# Patient Record
Sex: FEMALE | Race: WHITE | Employment: OTHER | URBAN - METROPOLITAN AREA
[De-identification: names, ages, dates, MRNs, and addresses within clinical notes are randomized per-mention and may not be internally consistent; named-entity substitution may affect disease eponyms.]

---

## 2017-02-23 ENCOUNTER — NURSE ONLY (OUTPATIENT)
Age: 62
End: 2017-02-23

## 2017-02-23 DIAGNOSIS — M81.0 OSTEOPOROSIS, POSTMENOPAUSAL: Primary | ICD-10-CM

## 2017-02-23 PROCEDURE — 96372 THER/PROPH/DIAG INJ SC/IM: CPT | Performed by: INTERNAL MEDICINE

## 2017-03-29 ENCOUNTER — EMPLOYEE WELLNESS (OUTPATIENT)
Dept: OTHER | Age: 62
End: 2017-03-29

## 2017-04-24 ENCOUNTER — OFFICE VISIT (OUTPATIENT)
Dept: ENT CLINIC | Age: 62
End: 2017-04-24

## 2017-04-24 VITALS
SYSTOLIC BLOOD PRESSURE: 101 MMHG | WEIGHT: 122.8 LBS | BODY MASS INDEX: 23.18 KG/M2 | HEART RATE: 76 BPM | DIASTOLIC BLOOD PRESSURE: 62 MMHG | TEMPERATURE: 98 F | HEIGHT: 61 IN

## 2017-04-24 DIAGNOSIS — H91.11: ICD-10-CM

## 2017-04-24 DIAGNOSIS — H69.80 ETD (EUSTACHIAN TUBE DYSFUNCTION), UNSPECIFIED LATERALITY: Primary | ICD-10-CM

## 2017-04-24 PROCEDURE — 99214 OFFICE O/P EST MOD 30 MIN: CPT | Performed by: OTOLARYNGOLOGY

## 2017-08-29 ENCOUNTER — HOSPITAL ENCOUNTER (OUTPATIENT)
Dept: GENERAL RADIOLOGY | Age: 62
Discharge: HOME | End: 2017-08-29
Attending: INTERNAL MEDICINE | Admitting: INTERNAL MEDICINE

## 2017-08-29 ENCOUNTER — OFFICE VISIT (OUTPATIENT)
Age: 62
End: 2017-08-29

## 2017-08-29 VITALS
SYSTOLIC BLOOD PRESSURE: 114 MMHG | HEIGHT: 61 IN | BODY MASS INDEX: 22.88 KG/M2 | WEIGHT: 121.2 LBS | DIASTOLIC BLOOD PRESSURE: 68 MMHG

## 2017-08-29 DIAGNOSIS — R82.994 HYPERCALCIURIA: Chronic | ICD-10-CM

## 2017-08-29 DIAGNOSIS — M81.0 OSTEOPOROSIS, POSTMENOPAUSAL: Primary | Chronic | ICD-10-CM

## 2017-08-29 DIAGNOSIS — M81.0 OSTEOPOROSIS, POSTMENOPAUSAL: Chronic | ICD-10-CM

## 2017-08-29 DIAGNOSIS — E55.9 VITAMIN D DEFICIENCY: Chronic | ICD-10-CM

## 2017-08-29 DIAGNOSIS — Z51.81 MEDICATION MONITORING ENCOUNTER: Chronic | ICD-10-CM

## 2017-08-29 PROCEDURE — 96372 THER/PROPH/DIAG INJ SC/IM: CPT | Performed by: INTERNAL MEDICINE

## 2017-08-29 PROCEDURE — 77080 DXA BONE DENSITY AXIAL: CPT | Performed by: INTERNAL MEDICINE

## 2017-08-29 PROCEDURE — 99214 OFFICE O/P EST MOD 30 MIN: CPT | Performed by: INTERNAL MEDICINE

## 2017-08-29 RX ORDER — HYDROCHLOROTHIAZIDE 12.5 MG/1
12.5 CAPSULE, GELATIN COATED ORAL EVERY MORNING
Qty: 90 CAPSULE | Refills: 4 | Status: SHIPPED | OUTPATIENT
Start: 2017-08-29 | End: 2018-09-24 | Stop reason: SDUPTHER

## 2017-08-31 ENCOUNTER — PROCEDURE VISIT (OUTPATIENT)
Age: 62
End: 2017-08-31

## 2017-08-31 DIAGNOSIS — M81.0 OSTEOPOROSIS, POSTMENOPAUSAL: Primary | Chronic | ICD-10-CM

## 2017-11-29 ENCOUNTER — HOSPITAL ENCOUNTER (OUTPATIENT)
Dept: WOMENS IMAGING | Age: 62
Discharge: HOME | End: 2017-11-29
Attending: OBSTETRICS & GYNECOLOGY | Admitting: OBSTETRICS & GYNECOLOGY

## 2017-11-29 DIAGNOSIS — Z12.31 VISIT FOR SCREENING MAMMOGRAM: ICD-10-CM

## 2018-02-23 DIAGNOSIS — M81.0 OSTEOPOROSIS, POSTMENOPAUSAL: Primary | Chronic | ICD-10-CM

## 2018-03-01 ENCOUNTER — NURSE ONLY (OUTPATIENT)
Age: 63
End: 2018-03-01

## 2018-03-01 DIAGNOSIS — M81.0 OSTEOPOROSIS, POSTMENOPAUSAL: Primary | Chronic | ICD-10-CM

## 2018-03-01 PROCEDURE — 96372 THER/PROPH/DIAG INJ SC/IM: CPT | Performed by: INTERNAL MEDICINE

## 2018-03-20 VITALS — BODY MASS INDEX: 22.93 KG/M2 | WEIGHT: 121 LBS

## 2018-04-12 ENCOUNTER — EMPLOYEE WELLNESS (OUTPATIENT)
Dept: OTHER | Age: 63
End: 2018-04-12

## 2018-04-12 LAB
CHOLESTEROL, TOTAL: 229 MG/DL (ref 0–199)
GLUCOSE BLD-MCNC: 89 MG/DL (ref 70–99)
HDLC SERPL-MCNC: 85 MG/DL (ref 40–60)
LDL CHOLESTEROL CALCULATED: 125 MG/DL
TRIGL SERPL-MCNC: 95 MG/DL (ref 0–150)

## 2018-04-16 VITALS — WEIGHT: 119 LBS | BODY MASS INDEX: 22.53 KG/M2

## 2018-07-17 ENCOUNTER — TELEPHONE (OUTPATIENT)
Age: 63
End: 2018-07-17

## 2018-07-17 NOTE — TELEPHONE ENCOUNTER
Patient called related to prolia. Patient supplied the prolia but she was billed 2100.00 for the medication. [de-identified]. Per office noted the patent supplied the prolia. Office will contact patient once charges are reversed. E-mail sent to Juanpablo Dietrich, manager related to this bill.  She will contact the billing departmant

## 2018-09-13 ENCOUNTER — OFFICE VISIT (OUTPATIENT)
Dept: ORTHOPEDIC SURGERY | Age: 63
End: 2018-09-13

## 2018-09-13 VITALS
BODY MASS INDEX: 22.66 KG/M2 | WEIGHT: 120 LBS | DIASTOLIC BLOOD PRESSURE: 68 MMHG | HEART RATE: 75 BPM | SYSTOLIC BLOOD PRESSURE: 108 MMHG | HEIGHT: 61 IN

## 2018-09-13 DIAGNOSIS — M79.645 CHRONIC PAIN OF LEFT THUMB: Primary | ICD-10-CM

## 2018-09-13 DIAGNOSIS — M18.12 ARTHRITIS OF CARPOMETACARPAL (CMC) JOINT OF LEFT THUMB: ICD-10-CM

## 2018-09-13 DIAGNOSIS — G89.29 CHRONIC PAIN OF LEFT THUMB: Primary | ICD-10-CM

## 2018-09-13 PROCEDURE — 99213 OFFICE O/P EST LOW 20 MIN: CPT | Performed by: ORTHOPAEDIC SURGERY

## 2018-09-13 PROCEDURE — L3918 METACARP FX ORTHOSIS PRE OTS: HCPCS | Performed by: ORTHOPAEDIC SURGERY

## 2018-09-13 NOTE — PROGRESS NOTES
and thumb abduction  Nontender along 1st dorsal compartment and negative Finkelstein's test left wrist  Nontender A1 pulley and no clicking or locking with thumb range of motion          Contralateral thumb:  No tenderness with CMC gring or firm pressure over                                  trapeziometacarpal joint. Satisfactory stability exists at MP joint. Negative provocative testing for left carpal tunnel syndrome including negative Tinel's and direct carpal tunnel compression test      DIAGNOSTIC TESTING: 3 of Left thumb(s): reveal degenerative change at the [facility] joint without obvious acute fracture. IMPRESSION AND PLAN: 57-year-old female with two-year history of left basal thumb joint pain  1. Degenerative arthritis of Left thumb(s). We discussed this common entity and appropriate conservative and surgical options. Appropriate initial steps include activity modification, rest, splinting, hand therapy, and injection. I also recommend utilizing  modifiers that decrease thumb pinch stress. Surgical intervention can usually be reserved for longstanding recalcitrant cases. Appropriate followup plans are discussed with the patient depending on the level of progress with the conservative care. For now, mutual decision was made to provide patient with thumb CMC wrap to be worn for comfort intermittently. We also discussed referral to occupational therapy today for activity modifications and strategies to improve her symptoms with day-to-day activities. She will also consider a corticosteroid injection in the future as an option and we'll plan to call with any worsening or persistent symptoms or with any other questions or concerns.  For now we will plan for follow-up on an as-needed basis as the patient desires

## 2018-09-14 DIAGNOSIS — M81.0 OSTEOPOROSIS, POSTMENOPAUSAL: Primary | Chronic | ICD-10-CM

## 2018-09-24 ENCOUNTER — PROCEDURE VISIT (OUTPATIENT)
Dept: ENDOCRINOLOGY | Age: 63
End: 2018-09-24
Payer: COMMERCIAL

## 2018-09-24 ENCOUNTER — HOSPITAL ENCOUNTER (OUTPATIENT)
Dept: OCCUPATIONAL THERAPY | Age: 63
Setting detail: THERAPIES SERIES
Discharge: HOME | End: 2018-09-24
Payer: COMMERCIAL

## 2018-09-24 ENCOUNTER — HOSPITAL ENCOUNTER (OUTPATIENT)
Dept: GENERAL RADIOLOGY | Age: 63
Discharge: HOME | End: 2018-09-24
Payer: COMMERCIAL

## 2018-09-24 ENCOUNTER — OFFICE VISIT (OUTPATIENT)
Dept: ENDOCRINOLOGY | Age: 63
End: 2018-09-24
Payer: COMMERCIAL

## 2018-09-24 VITALS
DIASTOLIC BLOOD PRESSURE: 63 MMHG | HEIGHT: 61 IN | BODY MASS INDEX: 23 KG/M2 | WEIGHT: 121.8 LBS | SYSTOLIC BLOOD PRESSURE: 101 MMHG

## 2018-09-24 DIAGNOSIS — M81.0 OSTEOPOROSIS, POSTMENOPAUSAL: Chronic | ICD-10-CM

## 2018-09-24 DIAGNOSIS — M81.0 OSTEOPOROSIS, POSTMENOPAUSAL: Primary | Chronic | ICD-10-CM

## 2018-09-24 DIAGNOSIS — E55.9 VITAMIN D DEFICIENCY: Chronic | ICD-10-CM

## 2018-09-24 DIAGNOSIS — Z51.81 MEDICATION MONITORING ENCOUNTER: Chronic | ICD-10-CM

## 2018-09-24 DIAGNOSIS — R82.994 HYPERCALCIURIA: Chronic | ICD-10-CM

## 2018-09-24 PROCEDURE — 97110 THERAPEUTIC EXERCISES: CPT | Performed by: OCCUPATIONAL THERAPIST

## 2018-09-24 PROCEDURE — 99214 OFFICE O/P EST MOD 30 MIN: CPT | Performed by: INTERNAL MEDICINE

## 2018-09-24 PROCEDURE — G8985 CARRY GOAL STATUS: HCPCS | Performed by: OCCUPATIONAL THERAPIST

## 2018-09-24 PROCEDURE — G8984 CARRY CURRENT STATUS: HCPCS | Performed by: OCCUPATIONAL THERAPIST

## 2018-09-24 PROCEDURE — 97535 SELF CARE MNGMENT TRAINING: CPT | Performed by: OCCUPATIONAL THERAPIST

## 2018-09-24 PROCEDURE — 77080 DXA BONE DENSITY AXIAL: CPT | Performed by: INTERNAL MEDICINE

## 2018-09-24 PROCEDURE — 97165 OT EVAL LOW COMPLEX 30 MIN: CPT | Performed by: OCCUPATIONAL THERAPIST

## 2018-09-24 PROCEDURE — 77080 DXA BONE DENSITY AXIAL: CPT

## 2018-09-24 PROCEDURE — 96372 THER/PROPH/DIAG INJ SC/IM: CPT | Performed by: INTERNAL MEDICINE

## 2018-09-24 RX ORDER — HYDROCHLOROTHIAZIDE 12.5 MG/1
12.5 CAPSULE, GELATIN COATED ORAL EVERY MORNING
Qty: 90 CAPSULE | Refills: 4 | Status: SHIPPED | OUTPATIENT
Start: 2018-09-24 | End: 2020-02-06

## 2018-09-24 NOTE — PLAN OF CARE
[facility]  [address], [address] [address], [address]  Phone: [phone] Fax: [phone]      [facility]  Dear Referring Practitioner: Sylvia Colvin MD,     We had the pleasure of evaluating the following patient for occupational therapy services at [address] [place] [address]. A summary of our findings can be found in the initial assessment below. This includes our plan of care. If you have any questions or concerns regarding these findings, please do not hesitate to contact me at the office phone number checked above. Thank you for the referral.     Physician Signature:_______________________________Date:__________________  By signing above (or electronic signature), therapists plan is approved by physician      Patient: Tyrel Jeff   : 1955   MRN: [id number]  Referring Physician: Referring Practitioner: Sylvia Colvin MD      Evaluation Date: 2018      Medical Diagnosis Information:  Diagnosis: M18.12 (ICD-10-CM) - Arthritis of carpometacarpal (Aia 16) joint of left thumb    Treatment Diagnosis: L thumb pain - M79.645                  Insurance information: OT Insurance Information: Medical [place]  Date of Injury: NA  Date of Surgery: NA      Precautions/ Contra-indications: -  Latex Allergy:  [x]No      []Yes  Pacemaker:  [x] No       [] Yes     Preferred Language for Healthcare:   [x]English       []other:      G-Codes:  OT G-codes  Functional Assessment Tool Used: Quick DASH  Score: 34%  Functional Limitation: Carrying, moving and handling objects  Carrying, Moving and Handling Objects Current Status (): At least 20 percent but less than 40 percent impaired, limited or restricted  Carrying, Moving and Handling Objects Goal Status ():  At least 20 percent but less than 40 percent impaired, limited or restricted    [x] Patient reported history, allergies, and medications reviewed - see intake form. SUBJECTIVE:  Background/Relevant Medical & Therapy History: progressive pain in L thumb x several years      Pain Scale: 0/10 at rest, 4/10 with activities, greater at times   []Constant      [x]Intermittent    []other:  Pain Location:  L thumb CMC  Easing factors: rest, heat  Provocative factors: activities, pressure      Occupational Profile:  Home Enviroment: lives with  [x] spouse,  [] family,  [] alone,  [] significant other,   [] other:    Occupation/School: nurse    Recreational Activities/Meaningful Interests: caring for granddaughter, working out    Prior Level of Function: [x] Independent with ADLs/IADLs     [] Assistance needed (describe):    Patient-Identified Primary Performance Deficits (to be addressed in POC):   [] bathing    [x] household tasks    [] dressing    [] self feeding   [] grooming    [x] work/education   [] functional mobility   [] sleeping/rest   [] toileting/hygiene   [x] recreational activities   [] driving    [] community/social participation   [] other:     Comorbidities Affecting Functional Performance:     []Anxiety (F41.9)/Depression (F32.9)   []Diabetes Type 1(E10.65) or 2 (E11.65)   []Rheumatoid Arthritis (M05.9)  []Fibromyalgia (M79.7)  []Neuropathy(G60.9)  [x]Osteoarthritis(M19.91)  []None   [x]Other: osteoporosis    Hand Dominance:   [x]  Right    [] Left      OBJECTIVE:     Involved   AROM: Right Left   Thumb MP  IP 0/67  >0/64 25HE//67  20HE/55   Thumb tip to DPFC  Below DPFC   Thumb RA               PA 50  45 50  45   Wrist Ext/Flex            RD/UD     Edema:     Thumb P1 5.8 5.8        Strength:      II 65# 60#   Lateral Pinch 14 14   3 Point Pinch 12 10     Observations (including splints, bandages, incisions, scars):    Hypermobile L thumb MP noted (severe MP HE observed with attempts at lateral pinch on L)     Sensation: [] No reported deficits  [x] Intact to light touch    [] Kensett Rodrigo test completed, findings as noted:  [] Other:    Palpation: mild tenderness about L thumb CMC/thenar region    Functional Mobility/Transfers/Gait: [x] Independent - no significant gait deviations  [] Assistance needed   [] Assistive device used: Falls Risk Assessment (30 days):   [x] Falls Risk assessed and no intervention required. [] Falls Risk assessed and Patient requires intervention due to being higher risk   TUG score (>12s at risk):     [] Falls education provided, including      Review Of Systems (ROS): [x]Performed Review of systems (Integumentary, CardioPulmonary, Neurological) by intake and observation. Intake form has been scanned into medical record. Patient has been instructed to contact their primary care physician regarding ROS issues if not already being addressed at this time. ASSESSMENT:   This patient presents with signs and symptoms consistent with the medical diagnosis provided by the referring physician. Impairments (physical, cognitive and/or psychosocial):  [x] Decreased/Excessive mobility [] Weakness    [] Hypersensitivity   [x] Pain/tenderness   [] Edema/swelling   [] Decreased coordination (fine/gross motor)   [] Impaired body mechanics  [] Sensory loss  [] Loss of balance   [] Other:      Performance Deficits (to be addressed in plan of care):   [] Bathing    [x] Household Tasks   [] Dressing    [] Self Feeding   [] Grooming    [x] Work/Education   [] Functional Mobility   [] Sleeping/Rest   [] Toileting/Hygiene   [x] Recreational Activities   [] Driving    [] Community/Social Participation   [] Other:     Rehab Potential:   [] Excellent [] Good [x] Fair  [] Poor     Barriers affecting rehab potential:  [x]Age    []Lack of Motivation   []Co-Morbidities  []Cognitive Function  []Environmental/home/work barriers  []Other:     Tolerance of evaluation/treatment:    [] Excellent [x] Good [] Fair  [] Poor      PLAN OF CARE:  Interventions:   [x] Therapeutic Exercise [x] Therapeutic Activity    [x] Activities of Daily Living [x] Neuromuscular Re-education      [x] Patient Education  [x] Manual Therapy      [x] Modalities as needed, and not otherwise contraindicated, including: ultrasound,paraffin,moist heat/cold pack, electrical stimulation, contrast bath, iontophoresis, fluidotherapy  [x] Splinting    Frequency/Duration:  1-2 visits over 4 weeks      GOALS:  Short Term Goals: To be achieved in: 2 weeks  1. Independent in HEP and progression per patient tolerance, in order to prevent re-injury. 2. Patient will have a decrease in pain to facilitate improvement in movement, function, and ADLs as indicated by Functional Deficits. Long Term Goals to be achieved in 4  weeks, including patient directed goals to address identified performance deficits:  1) Pt to be independent in graded HEP progression with a good level of effort and compliance. 2) Pt to report a score of 25 % or less on the Quick DASH disability questionnaire for increased performance with carrying, moving, and handling objects. 3) Pt will verbalize understanding and demonstrate competency with 3 joint protection and ADL modification techniques to enable independence with resistive household tasks (opening jar lids, cutting, carrying). 4) Pt will have a decrease in pain to 1-2/10 to facilitate improvement in performance of work tasks.         OCCUPATIONAL THERAPY EVALUATION COMPLEXITY JUSTIFICATION:    [x] An occupational profile and medical/therapy history, which includes:   [x] a brief history including medical and/or therapy records relating to the     presenting problem   [] an expanded review of medical and/or therapy records and additional review     of physical, cognitive or psychosocial history related to current functional    performance   [] an extensive additional review of review of medical and/or therapy records   and physical, cognitive, or psychosocial history related to current    functional performance    [x] An assessment that identifies performance deficits (relating to physical, cognitive, or psychosocial skills) that result in activity limitations and/or participation restrictions:   [x] 1-3 performance deficits   [] 3-5 performance deficits   [] 5 or more performance deficits    [x] Clinical decision making of:   [x] low complexity, including analysis of occupational profile, data analysis from problem focused assessment, and consideration of a limited number of treatment options. No comorbidities affect occupational performance. No task modifications or assistance needed to complete evaluation. [] moderate complexity, including analysis of occupational profile, data analysis from detailed assessment and consideration of several treatment options. Comorbidities that affect occupational performance may be present. Minimal to moderate task modifications or assistance needed to complete assessment. [] high complexity, including analysis of occupational profile, analysis of data from comprehensive assessment and consideration of multiple treatment options. Multiple comorbidities present that affect occupational performance. Significant task modifications or assistance needed to complete assessment. Evaluation Code:  [x] Low Complexity EVAL 06811 (typically 30 minutes face to face)  [] Mod Complexity EVAL 68322 (typically 45 minutes face to face)  [] High Complexity EVAL 17596 (typically 60 minutes face to face)             Electronically signed by:   Sola Colon OTR/L, PT, MPT, [address] [place] [address], [phone], [phone]

## 2018-09-24 NOTE — FLOWSHEET NOTE
[facility]  [address], [address] [facility]  Phone: [phone] Fax: [phone]      Hand Therapy Daily Treatment Note  Date:  2018    Patient: Fabrizio Michel   : 1955   MRN: [id number]  Referring Physician: Referring Practitioner: Ramu Ortiz MD       Medical Diagnosis Information:  Diagnosis: M18.12 (ICD-10-CM) - Arthritis of carpometacarpal (Aia 16) joint of left thumb    Treatment Diagnosis: L thumb pain - M79.645                                         Insurance information: OT Insurance Information: Medical [place]  Date of Injury:NA  Date of Surgery:NA      Visit # Insurance Allowable   1 BMN     Date of Patient follow up with Physician: prn    G-Codes:  OT G-codes  Functional Assessment Tool Used: Quick DASH  Score: 34%  Functional Limitation: Carrying, moving and handling objects  Carrying, Moving and Handling Objects Current Status (): At least 20 percent but less than 40 percent impaired, limited or restricted  Carrying, Moving and Handling Objects Goal Status ():  At least 20 percent but less than 40 percent impaired, limited or restricted    Progress Note: []  Yes  [x]  No  Next due by: Visit #10      Latex Allergy:  [x]NO      []YES            Pacemaker:  [x] No       [] Yes      Preferred Language for Healthcare:   [x]English       []other:    Pain level:  See eval    SUBJECTIVE:  See eval    RESTRICTIONS/PRECAUTIONS: -    OBJECTIVE:       Date:  2018       Objective Measures:         See eval                       Modalities:         Discussed heat/cold modalities for pain management                       Therapeutic Exercise, Activities, NMR:        AROM Instructed on general thumb/hand AROM, pinch/ mechanics, technique considerations for weight training/working out       ADL Retraining Instructed on diagnosis specific anatomy, joint protection, and ADL modifications - joint protection and extensibility and allowing for proper ROM for normal function with self care, reaching, carrying, lifting, house/yardwork, driving/computer work  [] Comments:    ADL Training:  [x]  (78974) Provided self-care/home management training related to activities of daily living and compensatory training, and/or use of adaptive equipment   [x] Comments: Instructed on diagnosis specific anatomy, joint protection, and ADL modifications - joint protection and resource information provided     Splinting:  [] Fabrication of:   [] (97168) Orthotic/Prosthetic Management, subsequent encounter  [] (04046) Orthotic management and training (fitting and assessment)  [x] Comments: discussed indications for and use of Neoprene and custom thermoplastic splints - pt has neoprene brace at home, desires to wait on fabrication of thermoplastic brace at this time     Charges:  Timed Code Treatment Minutes: 40   Total Treatment Minutes: 60     [x] EVAL (LOW) 30312   [] OT Re-eval (53949)  [] EVAL (MOD) 59219   [] EVAL (HIGH) 55380       [x] Haley (57651) x  1   [] UXFKP(94369)  [] NMR (43225) x      [] Estim (attended) (98802)   [] Manual ([id number]) x       [] US (08209)  [] TA (08294) x      [] Paraffin (30890)  [x] ADL  (31575) x  2  [] Splint/L code:    [] Estim (unattended) (16247)  [] Fluidotherapy (86916)  [] Other:    GOALS: ***    Progression Towards Functional goals:  [] Patient is progressing as expected towards functional goals listed. [] Progression is slowed due to complexities listed. [] Progression has been slowed due to co-morbidities.   [x] Plan just implemented, too soon to assess goals progression  [] All goals are met  [] Other:     ASSESSMENT:  See eval    Treatment/Activity Tolerance:  [x] Patient tolerated treatment well [] Patient limited by fatique  [] Patient limited by pain  [] Patient limited by other medical complications  [] Other:     Prognosis: [x] Good [] Fair  [] Poor    Patient Requires Follow-up: [] Yes  [x]

## 2018-09-24 NOTE — PROGRESS NOTES
to 11/2010 at the spine. Although stable since, BMD is still quite low. She is doing well with Prolia started 07/2014. Vitamin D deficiency has been corrected. Hypercalciuria is controlled with HCTZ 12.5 mg/d. PLANS: Continue HCTZ 12.5 mg/d. OK to give Prolia today and continue Prolia 60 mg SQ twice yearly. Return appointment with DXA in 1 year. I spent 25 minutes face to face with this patient. Over 50% of that time was spent on counseling and care coordination. See assessment and plan for counseling and care coordination details. Kwame Clark MD, Director, [facility]) Osteoporosis and Bone Health Services    CC: Ani Garcia MD

## 2018-10-16 ENCOUNTER — TELEPHONE (OUTPATIENT)
Dept: ORTHOPEDIC SURGERY | Age: 63
End: 2018-10-16

## 2018-11-27 ENCOUNTER — OFFICE VISIT (OUTPATIENT)
Dept: PSYCHOLOGY | Age: 63
End: 2018-11-27
Payer: COMMERCIAL

## 2018-11-27 DIAGNOSIS — F32.A DEPRESSION, UNSPECIFIED DEPRESSION TYPE: Primary | ICD-10-CM

## 2018-11-27 PROCEDURE — 90791 PSYCH DIAGNOSTIC EVALUATION: CPT | Performed by: PSYCHOLOGIST

## 2018-11-27 ASSESSMENT — PATIENT HEALTH QUESTIONNAIRE - PHQ9
SUM OF ALL RESPONSES TO PHQ QUESTIONS 1-9: 0
SUM OF ALL RESPONSES TO PHQ9 QUESTIONS 1 & 2: 0
2. FEELING DOWN, DEPRESSED OR HOPELESS: 0
1. LITTLE INTEREST OR PLEASURE IN DOING THINGS: 0
SUM OF ALL RESPONSES TO PHQ QUESTIONS 1-9: 0

## 2018-11-27 NOTE — PROGRESS NOTES
Behavioral Health Consultation  Moni Neal PsyD  Psychologist  11/27/2018  9:10 AM      Time spent with Patient: 35 minutes  This is patient's first  [facility] appointment. Reason for Consult:  Depression  Referring Provider: Destiny Valdes MD  [address] [place], [address]    Pt provided informed consent for the behavioral health program. Discussed with patient model of service to include the limits of confidentiality (i.e. abuse reporting, suicide intervention, etc.) and short-term intervention focused approach. Pt indicated understanding. Feedback given to PCP. 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 Pt assertively approached direct supervisor about his verbally aggressive behavior, he is now on a individualized behavior plan. Pt feels this is right and he is making changes but not sure if she still wants to be there.       Past psych tx: none    Current psych med tx: none    Psych ROS:      Depression: negative screen     Marii: DENIES insomnia with increased energy, rapid speech, easily distracted or decreased attention, irritability, racing thoughts, expansive mood, increase in energy and goal directed behavior, grandiosity, flight of ideas     Anxiety:  see JESSICA-7 score    OCD:  Denies     Panic:  Denies     Psychosis: denies A/VH, or delusions     PTSD:  negative screen    O:  MSE:    Appearance    alert, cooperative  Appetite normal  Sleep disturbance No  Fatigue Yes  Loss of pleasure Yes  Impulsive behavior No  Speech    normal rate, normal volume and well articulated  Mood    stressed  Affect    Congruent with full range  Thought Content    intact  Thought Process    linear, goal directed and coherent  Associations    logical connections  Insight    Good  Judgment    Intact  Orientation    oriented to person, place, time, and general circumstances  Memory    recent and remote

## 2018-12-04 ASSESSMENT — ANXIETY QUESTIONNAIRES
2. NOT BEING ABLE TO STOP OR CONTROL WORRYING: 1-SEVERAL DAYS
3. WORRYING TOO MUCH ABOUT DIFFERENT THINGS: 1-SEVERAL DAYS
4. TROUBLE RELAXING: 0-NOT AT ALL SURE
6. BECOMING EASILY ANNOYED OR IRRITABLE: 0-NOT AT ALL SURE
1. FEELING NERVOUS, ANXIOUS, OR ON EDGE: 1-SEVERAL DAYS
GAD7 TOTAL SCORE: 4
7. FEELING AFRAID AS IF SOMETHING AWFUL MIGHT HAPPEN: 1-SEVERAL DAYS
5. BEING SO RESTLESS THAT IT IS HARD TO SIT STILL: 0-NOT AT ALL SURE

## 2019-01-17 ENCOUNTER — HOSPITAL ENCOUNTER (OUTPATIENT)
Dept: WOMENS IMAGING | Age: 64
Discharge: HOME | End: 2019-01-17
Payer: COMMERCIAL

## 2019-01-17 DIAGNOSIS — Z12.31 VISIT FOR SCREENING MAMMOGRAM: ICD-10-CM

## 2019-01-17 PROCEDURE — 77063 BREAST TOMOSYNTHESIS BI: CPT

## 2019-01-29 ENCOUNTER — OFFICE VISIT (OUTPATIENT)
Dept: DERMATOLOGY | Age: 64
End: 2019-01-29
Payer: COMMERCIAL

## 2019-01-29 DIAGNOSIS — D22.9 MULTIPLE NEVI: Primary | ICD-10-CM

## 2019-01-29 DIAGNOSIS — L57.0 AK (ACTINIC KERATOSIS): ICD-10-CM

## 2019-01-29 DIAGNOSIS — L81.4 LENTIGINES: ICD-10-CM

## 2019-01-29 PROCEDURE — 17000 DESTRUCT PREMALG LESION: CPT | Performed by: DERMATOLOGY

## 2019-01-29 PROCEDURE — 99213 OFFICE O/P EST LOW 20 MIN: CPT | Performed by: DERMATOLOGY

## 2019-01-29 PROCEDURE — 17003 DESTRUCT PREMALG LES 2-14: CPT | Performed by: DERMATOLOGY

## 2019-03-28 ENCOUNTER — NURSE ONLY (OUTPATIENT)
Dept: ENDOCRINOLOGY | Age: 64
End: 2019-03-28
Payer: COMMERCIAL

## 2019-03-28 PROCEDURE — 96372 THER/PROPH/DIAG INJ SC/IM: CPT | Performed by: INTERNAL MEDICINE

## 2019-05-21 ENCOUNTER — OFFICE VISIT (OUTPATIENT)
Dept: FAMILY MEDICINE CLINIC | Age: 64
End: 2019-05-21
Payer: COMMERCIAL

## 2019-05-21 VITALS
DIASTOLIC BLOOD PRESSURE: 64 MMHG | OXYGEN SATURATION: 98 % | SYSTOLIC BLOOD PRESSURE: 112 MMHG | WEIGHT: 118 LBS | BODY MASS INDEX: 22.28 KG/M2 | HEART RATE: 71 BPM | HEIGHT: 61 IN

## 2019-05-21 DIAGNOSIS — R82.994 HYPERCALCIURIA: Chronic | ICD-10-CM

## 2019-05-21 DIAGNOSIS — E55.9 VITAMIN D DEFICIENCY: Chronic | ICD-10-CM

## 2019-05-21 DIAGNOSIS — R00.2 HEART PALPITATIONS: ICD-10-CM

## 2019-05-21 DIAGNOSIS — M81.0 OSTEOPOROSIS, POSTMENOPAUSAL: Primary | Chronic | ICD-10-CM

## 2019-05-21 LAB
A/G RATIO: 2.1 (ref 1.1–2.2)
ALBUMIN SERPL-MCNC: 4.5 G/DL (ref 3.4–5)
ALP BLD-CCNC: 45 U/L (ref 40–129)
ALT SERPL-CCNC: 12 U/L (ref 10–40)
ANION GAP SERPL CALCULATED.3IONS-SCNC: 10 MMOL/L (ref 3–16)
AST SERPL-CCNC: 17 U/L (ref 15–37)
BASOPHILS ABSOLUTE: 0 K/UL (ref 0–0.2)
BASOPHILS RELATIVE PERCENT: 0.5 %
BILIRUB SERPL-MCNC: 0.6 MG/DL (ref 0–1)
BUN BLDV-MCNC: 15 MG/DL (ref 7–20)
CALCIUM SERPL-MCNC: 9.6 MG/DL (ref 8.3–10.6)
CHLORIDE BLD-SCNC: 107 MMOL/L (ref 99–110)
CO2: 27 MMOL/L (ref 21–32)
CREAT SERPL-MCNC: 0.7 MG/DL (ref 0.6–1.2)
EOSINOPHILS ABSOLUTE: 0.1 K/UL (ref 0–0.6)
EOSINOPHILS RELATIVE PERCENT: 1.8 %
GFR AFRICAN AMERICAN: >60
GFR NON-AFRICAN AMERICAN: >60
GLOBULIN: 2.1 G/DL
GLUCOSE BLD-MCNC: 93 MG/DL (ref 70–99)
HCT VFR BLD CALC: 42.9 % (ref 36–48)
HEMOGLOBIN: 14.9 G/DL (ref 12–16)
LYMPHOCYTES ABSOLUTE: 1.3 K/UL (ref 1–5.1)
LYMPHOCYTES RELATIVE PERCENT: 33.3 %
MCH RBC QN AUTO: 31.7 PG (ref 26–34)
MCHC RBC AUTO-ENTMCNC: 34.7 G/DL (ref 31–36)
MCV RBC AUTO: 91.5 FL (ref 80–100)
MONOCYTES ABSOLUTE: 0.3 K/UL (ref 0–1.3)
MONOCYTES RELATIVE PERCENT: 8.6 %
NEUTROPHILS ABSOLUTE: 2.1 K/UL (ref 1.7–7.7)
NEUTROPHILS RELATIVE PERCENT: 55.8 %
PDW BLD-RTO: 13.5 % (ref 12.4–15.4)
PLATELET # BLD: 159 K/UL (ref 135–450)
PMV BLD AUTO: 9.2 FL (ref 5–10.5)
POTASSIUM SERPL-SCNC: 5 MMOL/L (ref 3.5–5.1)
RBC # BLD: 4.69 M/UL (ref 4–5.2)
SODIUM BLD-SCNC: 144 MMOL/L (ref 136–145)
TOTAL PROTEIN: 6.6 G/DL (ref 6.4–8.2)
TSH REFLEX: 1.74 UIU/ML (ref 0.27–4.2)
WBC # BLD: 3.8 K/UL (ref 4–11)

## 2019-05-21 PROCEDURE — 36415 COLL VENOUS BLD VENIPUNCTURE: CPT | Performed by: INTERNAL MEDICINE

## 2019-05-21 PROCEDURE — 93000 ELECTROCARDIOGRAM COMPLETE: CPT | Performed by: INTERNAL MEDICINE

## 2019-05-21 PROCEDURE — 99204 OFFICE O/P NEW MOD 45 MIN: CPT | Performed by: INTERNAL MEDICINE

## 2019-05-21 ASSESSMENT — PATIENT HEALTH QUESTIONNAIRE - PHQ9
SUM OF ALL RESPONSES TO PHQ QUESTIONS 1-9: 0
SUM OF ALL RESPONSES TO PHQ9 QUESTIONS 1 & 2: 0
1. LITTLE INTEREST OR PLEASURE IN DOING THINGS: 0
SUM OF ALL RESPONSES TO PHQ QUESTIONS 1-9: 0
2. FEELING DOWN, DEPRESSED OR HOPELESS: 0

## 2019-05-21 NOTE — PROGRESS NOTES
Kvng Salas   1955      Reason for visit:   Chief Complaint   Patient presents with    [facility]        HPI:  Patient presents establish care. She is a former patient of Dr. Chadwick Rogel. She has a history of postmenopausal osteoporosis currently managed with prolia. She has seen [facility] for this and is to follow with them annually. She is tolerating very well. She also has a history of hypercalciuria and has been worked up and currently managed with hydrochlorothiazide. She has a h/o vit d deficiency that was also corrected by endocrinology. She does report a recent issue with tachycardia. She reports this occurred one month ago and lasted for approximately 2 weeks. She believes it was related to stress at that time as it did resolve once the stress resolved. She has not had any recent chest pain, shortness of breath, palpitations. She reports that they did a rhythm strip for her in the endoscopy suite and there were no concerns. She otherwise feels well. 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 , Disp: , Rfl:     Multiple Vitamins-Minerals (THERAPEUTIC MULTIVITAMIN-MINERALS) tablet, Take 1 tablet by mouth daily.  Vit D 1000 units, Disp: , Rfl:     denosumab (PROLIA) 60 MG/ML SOLN SC injection, Inject 1 mL into the skin once for 1 dose Last dose 2018, Disp: 1 mL, Rfl: 1     No Known Allergies    Past Surgical History:   Procedure Laterality Date     SECTION  1985 [facility]    club foot surgery likely related to stress. We will do basic lab workup. She is counseled to contact me should she have any recurrent symptoms. She voiced agreement and understanding of plan. -     EKG 12 Lead  -     CBC Auto Differential  -     Comprehensive Metabolic Panel  -     TSH with Reflex    RTC 1 year     Kemal Jones M.D.   Internal Medicine and Pediatrics  [facility]

## 2019-05-24 ENCOUNTER — TELEPHONE (OUTPATIENT)
Dept: FAMILY MEDICINE CLINIC | Age: 64
End: 2019-05-24

## 2019-05-24 NOTE — TELEPHONE ENCOUNTER
The PT called in for labs I gave all available results. \"pls notify pt her white blood cell count is just below normal. However, I see it has been an intermittent issue for her in the past for many years. Is she aware of that? We can continue to monitor it annually or can also offer a repeat white blood cell count in 1 month to follow it if she has any concerning symptoms (weight loss, fever/chills, nights sweats)\"        PT was aware of white blood cell count and said if she has any changes she'll call the office.

## 2019-05-31 ENCOUNTER — APPOINTMENT (OUTPATIENT)
Dept: GENERAL RADIOLOGY | Age: 64
End: 2019-05-31
Payer: COMMERCIAL

## 2019-05-31 ENCOUNTER — HOSPITAL ENCOUNTER (EMERGENCY)
Age: 64
Discharge: HOME | End: 2019-05-31
Payer: COMMERCIAL

## 2019-05-31 ENCOUNTER — NURSE TRIAGE (OUTPATIENT)
Dept: OTHER | Facility: CLINIC | Age: 64
End: 2019-05-31

## 2019-05-31 VITALS
DIASTOLIC BLOOD PRESSURE: 67 MMHG | HEART RATE: 83 BPM | OXYGEN SATURATION: 97 % | SYSTOLIC BLOOD PRESSURE: 111 MMHG | TEMPERATURE: 98 F | RESPIRATION RATE: 16 BRPM

## 2019-05-31 DIAGNOSIS — S93.401A SPRAIN OF RIGHT ANKLE, UNSPECIFIED LIGAMENT, INITIAL ENCOUNTER: Primary | ICD-10-CM

## 2019-05-31 PROCEDURE — 73610 X-RAY EXAM OF ANKLE: CPT

## 2019-05-31 PROCEDURE — 99283 EMERGENCY DEPT VISIT LOW MDM: CPT

## 2019-05-31 ASSESSMENT — ENCOUNTER SYMPTOMS
NAUSEA: 0
VOMITING: 0

## 2019-05-31 ASSESSMENT — PAIN DESCRIPTION - PAIN TYPE: TYPE: ACUTE PAIN

## 2019-05-31 ASSESSMENT — PAIN DESCRIPTION - ORIENTATION: ORIENTATION: RIGHT

## 2019-05-31 ASSESSMENT — PAIN DESCRIPTION - LOCATION: LOCATION: ANKLE

## 2019-05-31 ASSESSMENT — PAIN SCALES - GENERAL: PAINLEVEL_OUTOF10: 3

## 2019-05-31 NOTE — TELEPHONE ENCOUNTER
Reason for Disposition   A 'snap' or 'pop' was heard at the time of injury    Protocols used: ANKLE AND FOOT INJURY-ADULT-[place]    Patient's location of employment: [employer]  Location of injury: [facility],   Time of injury: early afternoon  Last 4 of patient's SSN: [id number]  Location recommended for treatment: [address] [facility], but she will not be able to get there during their open hours, so will proceed to [facility] ER. 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 She is able to walk, pain 4/10, no bruising or swelling at this time. She heard/felt a pop at time of injury. She has filled out Safe [employer] and notified her supervisor.

## 2019-05-31 NOTE — ED PROVIDER NOTES
R-1Normal      hydrochlorothiazide (MICROZIDE) 12.5 MG capsule Take 1 capsule by mouth every morning, Disp-90 capsule, R-4Normal      Estradiol (VAGIFEM VA) Place  vaginally. Multiple Vitamins-Minerals (THERAPEUTIC MULTIVITAMIN-MINERALS) tablet Take 1 tablet by mouth daily. Vit D 1000 units             ALLERGIES     Patient has no known allergies. FAMILY HISTORY           Problem Relation Age of Onset    High Blood Pressure Other      Family Status   Relation Name Status    Mother   at age 80        pulmonary fibrosis    Father   at age 76        pulmonary emboli - post op    Other  (Not Specified)        SOCIAL HISTORY      reports that she has never smoked. She has never used smokeless tobacco. She reports that she drinks alcohol. She reports that she does not use drugs. PHYSICAL EXAM    (up to 7 for level 4, 8 or more for level 5)     ED Triage Vitals [19 1547]   BP Temp Temp Source Pulse Resp SpO2 Height Weight   111/67 98 °F (36.7 °C) Oral 83 16 97 % -- --       Physical Exam   Constitutional: She is oriented to person, place, and time. She appears well-developed and well-nourished. No distress. HENT:   Head: Normocephalic and atraumatic. Nose: Nose normal.   Eyes: EOM are normal.   Neck: Normal range of motion. Neck supple. Pulmonary/Chest: Effort normal. No respiratory distress. Musculoskeletal:   Mild TTP lateral aspect of the right ankle behind the fibula. There is no erythema edema or ecchymosis. Has decreased ROM due to pain. Foot and tib/fib nontender. PT pulse 2+. Compartments of the leg are soft. Neurological: She is alert and oriented to person, place, and time. Skin: Skin is warm and dry. She is not diaphoretic. Psychiatric: She has a normal mood and affect.  Her behavior is normal. Judgment and thought content normal.       DIFFERENTIAL DIAGNOSIS   Fracture, dislocation, soft tissue injury      DIAGNOSTICRESULTS         RADIOLOGY:   Non-plain [address]  [phone]    Schedule an appointment as soon as possible for a visit in 3 days  for reevaluation    [facility] Emergency Department  [address]  [phone]    As needed, If symptoms worsen      DISCHARGE MEDICATIONS:  [unfilled]    (Please note that portions ofthis note were completed with a voice recognition program.  Efforts were made to edit the dictations but occasionally words are mis-transcribed.)    Jennifer Morocho, [address] [place] [address]  05/31/19 [id number]

## 2019-07-16 ENCOUNTER — NURSE ONLY (OUTPATIENT)
Dept: FAMILY MEDICINE CLINIC | Age: 64
End: 2019-07-16
Payer: COMMERCIAL

## 2019-07-16 DIAGNOSIS — D72.819 LEUKOPENIA, UNSPECIFIED TYPE: Primary | ICD-10-CM

## 2019-07-16 LAB
BASOPHILS ABSOLUTE: 0 K/UL (ref 0–0.2)
BASOPHILS RELATIVE PERCENT: 0.5 %
EOSINOPHILS ABSOLUTE: 0.1 K/UL (ref 0–0.6)
EOSINOPHILS RELATIVE PERCENT: 1.7 %
HCT VFR BLD CALC: 40.4 % (ref 36–48)
HEMOGLOBIN: 13.8 G/DL (ref 12–16)
LYMPHOCYTES ABSOLUTE: 1.6 K/UL (ref 1–5.1)
LYMPHOCYTES RELATIVE PERCENT: 31.6 %
MCH RBC QN AUTO: 31.3 PG (ref 26–34)
MCHC RBC AUTO-ENTMCNC: 34.1 G/DL (ref 31–36)
MCV RBC AUTO: 91.9 FL (ref 80–100)
MONOCYTES ABSOLUTE: 0.4 K/UL (ref 0–1.3)
MONOCYTES RELATIVE PERCENT: 6.9 %
NEUTROPHILS ABSOLUTE: 3 K/UL (ref 1.7–7.7)
NEUTROPHILS RELATIVE PERCENT: 59.3 %
PDW BLD-RTO: 13.2 % (ref 12.4–15.4)
PLATELET # BLD: 156 K/UL (ref 135–450)
PMV BLD AUTO: 9.2 FL (ref 5–10.5)
RBC # BLD: 4.39 M/UL (ref 4–5.2)
WBC # BLD: 5.1 K/UL (ref 4–11)

## 2019-07-16 PROCEDURE — 36415 COLL VENOUS BLD VENIPUNCTURE: CPT | Performed by: INTERNAL MEDICINE

## 2019-10-10 ENCOUNTER — NURSE ONLY (OUTPATIENT)
Dept: ENDOCRINOLOGY | Age: 64
End: 2019-10-10
Payer: COMMERCIAL

## 2019-10-10 DIAGNOSIS — M81.0 OSTEOPOROSIS, POSTMENOPAUSAL: Chronic | ICD-10-CM

## 2019-10-10 PROCEDURE — 96372 THER/PROPH/DIAG INJ SC/IM: CPT | Performed by: INTERNAL MEDICINE

## 2019-10-29 ENCOUNTER — TELEPHONE (OUTPATIENT)
Dept: PHARMACY | Age: 64
End: 2019-10-29

## 2019-11-26 ENCOUNTER — HOSPITAL ENCOUNTER (OUTPATIENT)
Dept: GENERAL RADIOLOGY | Age: 64
Discharge: HOME | End: 2019-11-26
Payer: COMMERCIAL

## 2019-11-26 ENCOUNTER — PROCEDURE VISIT (OUTPATIENT)
Dept: ENDOCRINOLOGY | Age: 64
End: 2019-11-26
Payer: COMMERCIAL

## 2019-11-26 ENCOUNTER — OFFICE VISIT (OUTPATIENT)
Dept: ENDOCRINOLOGY | Age: 64
End: 2019-11-26
Payer: COMMERCIAL

## 2019-11-26 VITALS
DIASTOLIC BLOOD PRESSURE: 64 MMHG | BODY MASS INDEX: 21.52 KG/M2 | HEIGHT: 61 IN | WEIGHT: 114 LBS | SYSTOLIC BLOOD PRESSURE: 102 MMHG | OXYGEN SATURATION: 97 %

## 2019-11-26 DIAGNOSIS — R82.994 HYPERCALCIURIA: Chronic | ICD-10-CM

## 2019-11-26 DIAGNOSIS — Z51.81 MEDICATION MONITORING ENCOUNTER: Chronic | ICD-10-CM

## 2019-11-26 DIAGNOSIS — M81.0 OSTEOPOROSIS, POSTMENOPAUSAL: Chronic | ICD-10-CM

## 2019-11-26 DIAGNOSIS — E55.9 VITAMIN D DEFICIENCY: Chronic | ICD-10-CM

## 2019-11-26 DIAGNOSIS — M81.0 OSTEOPOROSIS, POSTMENOPAUSAL: Primary | Chronic | ICD-10-CM

## 2019-11-26 DIAGNOSIS — M81.0 OSTEOPOROSIS, POSTMENOPAUSAL: ICD-10-CM

## 2019-11-26 PROCEDURE — 77080 DXA BONE DENSITY AXIAL: CPT

## 2019-11-26 PROCEDURE — 99214 OFFICE O/P EST MOD 30 MIN: CPT | Performed by: INTERNAL MEDICINE

## 2019-11-26 PROCEDURE — 77080 DXA BONE DENSITY AXIAL: CPT | Performed by: INTERNAL MEDICINE

## 2019-12-09 ENCOUNTER — ANESTHESIA EVENT (OUTPATIENT)
Dept: ENDOSCOPY | Age: 64
End: 2019-12-09
Payer: COMMERCIAL

## 2019-12-09 ENCOUNTER — HOSPITAL ENCOUNTER (OUTPATIENT)
Age: 64
Setting detail: OUTPATIENT SURGERY
Discharge: HOME | End: 2019-12-09
Attending: INTERNAL MEDICINE | Admitting: INTERNAL MEDICINE
Payer: COMMERCIAL

## 2019-12-09 ENCOUNTER — ANESTHESIA (OUTPATIENT)
Dept: ENDOSCOPY | Age: 64
End: 2019-12-09
Payer: COMMERCIAL

## 2019-12-09 VITALS — SYSTOLIC BLOOD PRESSURE: 103 MMHG | OXYGEN SATURATION: 100 % | DIASTOLIC BLOOD PRESSURE: 62 MMHG

## 2019-12-09 VITALS
SYSTOLIC BLOOD PRESSURE: 117 MMHG | TEMPERATURE: 97 F | BODY MASS INDEX: 20.77 KG/M2 | RESPIRATION RATE: 18 BRPM | HEIGHT: 61 IN | DIASTOLIC BLOOD PRESSURE: 80 MMHG | WEIGHT: 110 LBS | HEART RATE: 68 BPM | OXYGEN SATURATION: 100 %

## 2019-12-09 PROCEDURE — 2709999900 HC NON-CHARGEABLE SUPPLY: Performed by: INTERNAL MEDICINE

## 2019-12-09 PROCEDURE — 7100000011 HC PHASE II RECOVERY - ADDTL 15 MIN: Performed by: INTERNAL MEDICINE

## 2019-12-09 PROCEDURE — 3700000000 HC ANESTHESIA ATTENDED CARE: Performed by: INTERNAL MEDICINE

## 2019-12-09 PROCEDURE — 3700000001 HC ADD 15 MINUTES (ANESTHESIA): Performed by: INTERNAL MEDICINE

## 2019-12-09 PROCEDURE — 6370000000 HC RX 637 (ALT 250 FOR IP): Performed by: INTERNAL MEDICINE

## 2019-12-09 PROCEDURE — 88305 TISSUE EXAM BY PATHOLOGIST: CPT

## 2019-12-09 PROCEDURE — 3609010600 HC COLONOSCOPY POLYPECTOMY SNARE/COLD BIOPSY: Performed by: INTERNAL MEDICINE

## 2019-12-09 PROCEDURE — 2580000003 HC RX 258: Performed by: INTERNAL MEDICINE

## 2019-12-09 PROCEDURE — 6360000002 HC RX W HCPCS: Performed by: NURSE ANESTHETIST, CERTIFIED REGISTERED

## 2019-12-09 PROCEDURE — 2500000003 HC RX 250 WO HCPCS: Performed by: NURSE ANESTHETIST, CERTIFIED REGISTERED

## 2019-12-09 PROCEDURE — 7100000010 HC PHASE II RECOVERY - FIRST 15 MIN: Performed by: INTERNAL MEDICINE

## 2019-12-09 RX ORDER — PROPOFOL 10 MG/ML
INJECTION, EMULSION INTRAVENOUS PRN
Status: DISCONTINUED | OUTPATIENT
Start: 2019-12-09 | End: 2019-12-09 | Stop reason: SDUPTHER

## 2019-12-09 RX ORDER — SODIUM CHLORIDE 9 MG/ML
INJECTION, SOLUTION INTRAVENOUS CONTINUOUS
Status: DISCONTINUED | OUTPATIENT
Start: 2019-12-09 | End: 2019-12-09 | Stop reason: HOSPADM

## 2019-12-09 RX ORDER — LIDOCAINE HYDROCHLORIDE 20 MG/ML
INJECTION, SOLUTION INFILTRATION; PERINEURAL PRN
Status: DISCONTINUED | OUTPATIENT
Start: 2019-12-09 | End: 2019-12-09 | Stop reason: SDUPTHER

## 2019-12-09 RX ADMIN — LIDOCAINE HYDROCHLORIDE 100 MG: 20 INJECTION, SOLUTION INFILTRATION; PERINEURAL at 08:32

## 2019-12-09 RX ADMIN — SODIUM CHLORIDE: 9 INJECTION, SOLUTION INTRAVENOUS at 06:13

## 2019-12-09 RX ADMIN — PROPOFOL 100 MG: 10 INJECTION, EMULSION INTRAVENOUS at 08:32

## 2019-12-09 RX ADMIN — SODIUM CHLORIDE: 9 INJECTION, SOLUTION INTRAVENOUS at 08:41

## 2019-12-09 RX ADMIN — PROPOFOL 30 MG: 10 INJECTION, EMULSION INTRAVENOUS at 08:44

## 2019-12-09 RX ADMIN — PROPOFOL 30 MG: 10 INJECTION, EMULSION INTRAVENOUS at 08:38

## 2019-12-09 ASSESSMENT — PAIN - FUNCTIONAL ASSESSMENT: PAIN_FUNCTIONAL_ASSESSMENT: 0-10

## 2019-12-09 ASSESSMENT — PAIN SCALES - GENERAL
PAINLEVEL_OUTOF10: 0

## 2019-12-18 ENCOUNTER — VIRTUAL VISIT (OUTPATIENT)
Dept: PHARMACY | Age: 64
End: 2019-12-18

## 2019-12-18 VITALS
DIASTOLIC BLOOD PRESSURE: 70 MMHG | OXYGEN SATURATION: 98 % | RESPIRATION RATE: 12 BRPM | SYSTOLIC BLOOD PRESSURE: 120 MMHG | HEART RATE: 90 BPM | WEIGHT: 110 LBS | BODY MASS INDEX: 20.78 KG/M2

## 2019-12-18 DIAGNOSIS — M81.0 OSTEOPOROSIS, POSTMENOPAUSAL: Primary | ICD-10-CM

## 2019-12-18 ASSESSMENT — PROMIS GLOBAL HEALTH SCALE
IN GENERAL, WOULD YOU SAY YOUR QUALITY OF LIFE IS...[ON A SCALE OF 1 (POOR) TO 5 (EXCELLENT)]: 5
IN GENERAL, PLEASE RATE HOW WELL YOU CARRY OUT YOUR USUAL SOCIAL ACTIVITIES (INCLUDES ACTIVITIES AT HOME, AT WORK, AND IN YOUR COMMUNITY, AND RESPONSIBILITIES AS A PARENT, CHILD, SPOUSE, EMPLOYEE, FRIEND, ETC) [ON A SCALE OF 1 (POOR) TO 5 (EXCELLENT)]?: 5
TO WHAT EXTENT ARE YOU ABLE TO CARRY OUT YOUR EVERYDAY PHYSICAL ACTIVITIES SUCH AS WALKING, CLIMBING STAIRS, CARRYING GROCERIES, OR MOVING A CHAIR [ON A SCALE OF 1 (NOT AT ALL) TO 5 (COMPLETELY)]?: 5
SUM OF RESPONSES TO QUESTIONS 2, 4, 5, & 10: 16
WHO IS THE PERSON COMPLETING THE PROMIS V1.1 SURVEY?: 0
IN GENERAL, HOW WOULD YOU RATE YOUR SATISFACTION WITH YOUR SOCIAL ACTIVITIES AND RELATIONSHIPS [ON A SCALE OF 1 (POOR) TO 5 (EXCELLENT)]?: 5
IN THE PAST 7 DAYS, HOW WOULD YOU RATE YOUR FATIGUE ON AVERAGE [ON A SCALE FROM 1 (NONE) TO 5 (VERY SEVERE)]?: 5
IN GENERAL, WOULD YOU SAY YOUR HEALTH IS...[ON A SCALE OF 1 (POOR) TO 5 (EXCELLENT)]: 5
IN THE PAST 7 DAYS, HOW OFTEN HAVE YOU BEEN BOTHERED BY EMOTIONAL PROBLEMS, SUCH AS FEELING ANXIOUS, DEPRESSED, OR IRRITABLE [ON A SCALE FROM 1 (NEVER) TO 5 (ALWAYS)]?: 1
IN GENERAL, HOW WOULD YOU RATE YOUR MENTAL HEALTH, INCLUDING YOUR MOOD AND YOUR ABILITY TO THINK [ON A SCALE OF 1 (POOR) TO 5 (EXCELLENT)]?: 5
SUM OF RESPONSES TO QUESTIONS 3, 6, 7, & 8: 15
HOW IS THE PROMIS V1.1 BEING ADMINISTERED?: 2
IN THE PAST 7 DAYS, HOW WOULD YOU RATE YOUR PAIN ON AVERAGE [ON A SCALE FROM 0 (NO PAIN) TO 10 (WORST IMAGINABLE PAIN)]?: 0
IN GENERAL, HOW WOULD YOU RATE YOUR PHYSICAL HEALTH [ON A SCALE OF 1 (POOR) TO 5 (EXCELLENT)]?: 5

## 2020-01-21 ENCOUNTER — HOSPITAL ENCOUNTER (OUTPATIENT)
Dept: WOMENS IMAGING | Age: 65
Discharge: HOME | End: 2020-01-21
Payer: COMMERCIAL

## 2020-01-21 PROCEDURE — 77067 SCR MAMMO BI INCL CAD: CPT

## 2020-02-06 RX ORDER — HYDROCHLOROTHIAZIDE 12.5 MG/1
12.5 CAPSULE, GELATIN COATED ORAL EVERY MORNING
Qty: 90 CAPSULE | Refills: 3 | Status: SHIPPED | OUTPATIENT
Start: 2020-02-06 | End: 2021-05-17

## 2020-02-13 ENCOUNTER — OFFICE VISIT (OUTPATIENT)
Dept: DERMATOLOGY | Age: 65
End: 2020-02-13
Payer: COMMERCIAL

## 2020-02-13 PROCEDURE — 17003 DESTRUCT PREMALG LES 2-14: CPT | Performed by: DERMATOLOGY

## 2020-02-13 PROCEDURE — 99214 OFFICE O/P EST MOD 30 MIN: CPT | Performed by: DERMATOLOGY

## 2020-02-13 PROCEDURE — 17000 DESTRUCT PREMALG LESION: CPT | Performed by: DERMATOLOGY

## 2020-03-30 ENCOUNTER — TELEPHONE (OUTPATIENT)
Dept: PHARMACY | Age: 65
End: 2020-03-30

## 2020-04-02 RX ORDER — DENOSUMAB 60 MG/ML
60 INJECTION SUBCUTANEOUS
Qty: 1 ML | Refills: 1 | Status: SHIPPED | OUTPATIENT
Start: 2020-04-02 | End: 2020-09-30

## 2020-04-16 ENCOUNTER — NURSE ONLY (OUTPATIENT)
Dept: ENDOCRINOLOGY | Age: 65
End: 2020-04-16
Payer: COMMERCIAL

## 2020-04-16 PROCEDURE — 96372 THER/PROPH/DIAG INJ SC/IM: CPT | Performed by: INTERNAL MEDICINE

## 2020-09-01 RX ORDER — DENOSUMAB 60 MG/ML
60 INJECTION SUBCUTANEOUS ONCE
Qty: 1 ML | Refills: 0 | Status: SHIPPED | OUTPATIENT
Start: 2020-09-01 | End: 2021-10-12 | Stop reason: SDUPTHER

## 2020-09-30 ENCOUNTER — SCHEDULED TELEPHONE ENCOUNTER (OUTPATIENT)
Dept: PHARMACY | Age: 65
End: 2020-09-30

## 2020-09-30 NOTE — TELEPHONE ENCOUNTER
Lavon Li is a 59 y.o. 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    Osteoporosis       Social History     Socioeconomic History    Marital status:      Spouse name: Not on file    Number of children: Not on file    Years of education: Not on file    Highest education level: Not on file   Occupational History    Occupation: RN   Social Needs    Financial resource strain: Not on file    Food insecurity     Worry: Not on file     Inability: Not on file   [employer] needs     Medical: Not on file     Non-medical: Not on file   Tobacco Use    Smoking status: Never Smoker    Smokeless tobacco: Never Used   Substance and Sexual Activity    Alcohol use: Yes     Comment: occasionally     Drug use: No    Sexual activity: Not on file   Lifestyle    Physical activity     Days per week: Not on file     Minutes per session: Not on file    Stress: Not on file   Relationships    Social connections     Talks on phone: Not on file     Gets together: Not on file     Attends Worship service: Not on file     Active member of club or organization: Not on file     Attends meetings of clubs or organizations: Not on file     Relationship status: Not on file    Intimate partner violence     Fear of current or ex partner: Not on file     Emotionally abused: Not on file     Physically abused: Not on file     Forced sexual activity: Not on file   Other Topics Concern    Not on file   Social History Narrative    Not on file     Family History   Problem Relation Age of Onset    High Blood Pressure Other      INTERM HISTORY  Last seen by [facility]: 12/18/19  Last seen by Specialist: Dr. Arturo Stokes on 11/26/19 with follow up in 1 year. Scheduled for 11/30/20. Have you been diagnosed with any additional conditions since we last talked? no  Have you developed any new allergies since we last talked? no  Have you stopped taking any medications or supplements since we last talked? no  Have you started taking any additional medications or supplements since we last talked? no    Osteoporosis: Patient complains of osteoporosis. She was diagnosed with osteoporosis by bone density scan in 2010. Patient admits to history of fracture, foot fracture. The cause of osteoporosis is felt to be due to postmenopausal estrogen deficiency and idiopathic hypercalciuria. She is not currently being treated with calcium and vitamin D supplementation, levels are adequate. She is not currently being treated with bisphosphonates, trialed in past and stopped by dentist and changed to Prolia for better BMD improvement. Prolia started July 2014 per note from Dr. Dm Garay. 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  neck Trochanter Total hip   10/17/2008 0.850 Invalid Invalid Invalid   11/22/2010 0.762 0.567 0.563 Invalid   11/09/2011 0.733 0.587 0.561 0.716   07/09/2013 0.699 0.563 0.540 0.675   07/22/2014 0.703 0.546 0.550 0.690   07/28/2015 0.731 0.558 0.582 0.716   08/16/2016 0.764 0.562 0.606 0.729   08/29/2017 0.748 0.584 0.610 0.737 reactions; routine dental exam (prior to treatment); Bone mineral density (BMD) should be evaluated 1 to 2 years after initiating therapy; annual measurements of height and weight, assessment of chronic back pain; serum 25(OH)D  Storage Refrigerate at 2°C to 8°C (36°F to 46°F) in the original carton. Do not freeze. Prior to administration: Prolia may be allowed to reach room temperature (up to 25°C/77°F) in the original container. Once removed from the refrigerator, Prolia must not be exposed to temperatures above 25°C/77°F and must be used within 14 days. If not used within the 14 days, Prolia should be discarded. Do not use Prolia after the expiry date printed on the label. Describe your medication adherence over the last 4 weeks: Excellent  How many doses have you missed in the last 4 weeks, if any? 0  How confident are you to follow the injection process and the treatment plan? (0-10) 10  Contraindications to therapy present? No    IMMUNIZATIONS  Immunization History   Administered Date(s) Administered    Influenza Virus Vaccine 10/11/2017, 09/28/2018, 09/30/2019    Influenza, High Dose (Fluzone 65 yrs and older) 10/05/2018    Influenza, Quadv, IM, (6 mo and older Fluzone, Flulaval, Fluarix and 3 yrs and older Afluria) 10/11/2017    Zoster Recombinant (Shingrix) 08/01/2019, 10/24/2019      Immunization status: up to date and documented, will be due for PPSV23 after turning 71 yo. Flu shot will be due later this year. Will ask about Tetanus booster. ASSESSMENTS  Fall Risk 12/18/2019   2 or more falls in past year? no   Fall with injury in past year? no     PROMIS V1.1 Global Health 12/18/2019   In general, would you say your health is: 5   In general, would you say your quality of life is: 5   In general, how would you rate your physical health? 5   In general, how would you rate your mental health, including your mood and your ability to think?  5   In general, how would you rate your satisfaction with your social activities and relationships? 5   In general, please rate how well you carry out your usual social activities and roles. (This includes activities at home, at work and in your community, and responsibilities as a parent, child, spouse, employee, friend, etc.) 5   To what extent are you able to carry out your everyday physical activities such as walking, climbing stairs, carrying groceries, or moving a chair? 5   In the past 7 days how often have you been bothered by emotional problems such as feeling anxious, depressed or irritable? 1   In the past 7 days how would you rate your fatigue on average? 5   In the past 7 days how would you rate your pain on average? 0   How comfortable are you filling out medical forms by yourself? 4   What amount of pain have you experienced in the last week in your other knee/hip? 0   My BACK PAIN at the moment is 0   Mode of Collection 2   Person Completing Survey 0   PROMIS Physical Score 15   PROMIS Mental Score 16     1. Osteoporosis  Patient has good knowledge and understanding of current medication and disease state. Patient disease is controlled on current therapy. Dose is  appropriate based on patient weight and laboratory findings. Contraindications to therapy present? No    2. Immunization  Immunization status: up to date and documented, will be due for flu shot later this year, PPSV23 due after patient turns 71 yo, and will ask about tetanus booster as care gap tab indicates it is due. 3. Drug Interaction  N/A    4. Other Identified Potential Issues  N/A    PLAN  Goals of therapy, common side effects, medication storage, and administration reviewed with patient. continue Prolia 60 mg every 6 months. Patient has refill on file. Patient has follow up on 11/30/20 with Dr. Chris Diane. Patient reports her next dose in April 2021 will be when patient is on Medicare. Patient has follow up with PCP on 10/5/20.     Recommended labwork:  -SCr last checked 5/21/19  -Calcium last checked 5/21/19  -Albumin last checked 5/21/19  -Magnesium, no records on file  -Phosphorus last checked 2016  -Ht/wt last checked Dec 2019  Recommended vaccines: Flu vaccine this fall, PPSV23 due after patient turns 73 yo, Tetanus booster is due per care gap. Keep all scheduled appointments. Return to clinic in 6 month(s) via phone if still on [employer]. or call with any questions or concerns. If no [employer], patient will be discharged from [facility] and will fill through new Medicare coverage. Will send patient Medicare transition phone number offered through [phone] [facility].

## 2020-10-05 ENCOUNTER — OFFICE VISIT (OUTPATIENT)
Dept: FAMILY MEDICINE CLINIC | Age: 65
End: 2020-10-05
Payer: COMMERCIAL

## 2020-10-05 VITALS
OXYGEN SATURATION: 99 % | DIASTOLIC BLOOD PRESSURE: 77 MMHG | BODY MASS INDEX: 22.3 KG/M2 | SYSTOLIC BLOOD PRESSURE: 113 MMHG | RESPIRATION RATE: 16 BRPM | WEIGHT: 118 LBS | HEART RATE: 80 BPM

## 2020-10-05 LAB
A/G RATIO: 2.5 (ref 1.1–2.2)
ALBUMIN SERPL-MCNC: 4.7 G/DL (ref 3.4–5)
ALP BLD-CCNC: 56 U/L (ref 40–129)
ALT SERPL-CCNC: 16 U/L (ref 10–40)
ANION GAP SERPL CALCULATED.3IONS-SCNC: 12 MMOL/L (ref 3–16)
AST SERPL-CCNC: 23 U/L (ref 15–37)
BILIRUB SERPL-MCNC: 0.5 MG/DL (ref 0–1)
BUN BLDV-MCNC: 15 MG/DL (ref 7–20)
CALCIUM SERPL-MCNC: 9.6 MG/DL (ref 8.3–10.6)
CHLORIDE BLD-SCNC: 102 MMOL/L (ref 99–110)
CHOLESTEROL, TOTAL: 254 MG/DL (ref 0–199)
CO2: 28 MMOL/L (ref 21–32)
CREAT SERPL-MCNC: 0.6 MG/DL (ref 0.6–1.2)
GFR AFRICAN AMERICAN: >60
GFR NON-AFRICAN AMERICAN: >60
GLOBULIN: 1.9 G/DL
GLUCOSE BLD-MCNC: 92 MG/DL (ref 70–99)
HDLC SERPL-MCNC: 97 MG/DL (ref 40–60)
LDL CHOLESTEROL CALCULATED: 136 MG/DL
POTASSIUM SERPL-SCNC: 3.9 MMOL/L (ref 3.5–5.1)
SODIUM BLD-SCNC: 142 MMOL/L (ref 136–145)
TOTAL PROTEIN: 6.6 G/DL (ref 6.4–8.2)
TRIGL SERPL-MCNC: 103 MG/DL (ref 0–150)
VITAMIN D 25-HYDROXY: 46.8 NG/ML
VLDLC SERPL CALC-MCNC: 21 MG/DL

## 2020-10-05 PROCEDURE — 90715 TDAP VACCINE 7 YRS/> IM: CPT | Performed by: INTERNAL MEDICINE

## 2020-10-05 PROCEDURE — 36415 COLL VENOUS BLD VENIPUNCTURE: CPT | Performed by: INTERNAL MEDICINE

## 2020-10-05 PROCEDURE — 90472 IMMUNIZATION ADMIN EACH ADD: CPT | Performed by: INTERNAL MEDICINE

## 2020-10-05 PROCEDURE — 90686 IIV4 VACC NO PRSV 0.5 ML IM: CPT | Performed by: INTERNAL MEDICINE

## 2020-10-05 PROCEDURE — 90471 IMMUNIZATION ADMIN: CPT | Performed by: INTERNAL MEDICINE

## 2020-10-05 PROCEDURE — 99396 PREV VISIT EST AGE 40-64: CPT | Performed by: INTERNAL MEDICINE

## 2020-10-05 ASSESSMENT — ENCOUNTER SYMPTOMS
ABDOMINAL PAIN: 0
COUGH: 0
EYE PAIN: 0
CONSTIPATION: 0
BLOOD IN STOOL: 0
TROUBLE SWALLOWING: 0
NAUSEA: 0
DIARRHEA: 0
EYE REDNESS: 0
SORE THROAT: 0
SHORTNESS OF BREATH: 0
BACK PAIN: 0
VOMITING: 0

## 2020-10-05 ASSESSMENT — PATIENT HEALTH QUESTIONNAIRE - PHQ9
SUM OF ALL RESPONSES TO PHQ QUESTIONS 1-9: 0
1. LITTLE INTEREST OR PLEASURE IN DOING THINGS: 0
2. FEELING DOWN, DEPRESSED OR HOPELESS: 0
SUM OF ALL RESPONSES TO PHQ9 QUESTIONS 1 & 2: 0
SUM OF ALL RESPONSES TO PHQ QUESTIONS 1-9: 0

## 2020-10-05 NOTE — PROGRESS NOTES
10/5/2020    Nicolle Tam (:  1955) is a 59 y.o. female, here for a preventive medicine evaluation. Patient presents for annual exam.  She has been doing well in the interim. No new health concerns. She retired in May and is happy with her life. She has a history of postmenopausal osteoporosis currently managed with prolia. She has seen [facility] for this and is to follow with them annually. She is tolerating very well. She also has a history of hypercalciuria and has been worked up and currently managed with hydrochlorothiazide. She has a h/o vit d deficiency     Mammogram: 2020 utd  Bone density: last done 2019- osteoporosis on prolia with Dr. Galina Zhou, due in october  Pap: last done in 2020 with [facility] ; also gets estradiol there  Colonoscopy: 2019 -5 year return, 2 polyps  Tobacco: never smoker  Immunizations: shingrix done, tdap done 10 years ago, would like flu shot today    The 10-year ASCVD risk score (Mabel Trinh, et al., 2013) is: 3.5%    Values used to calculate the score:      Age: 59 years      Sex: Female      Is Non- : No      Diabetic: No      Tobacco smoker: No      Systolic Blood Pressure: 311 mmHg      Is BP treated: No      HDL Cholesterol: 85 mg/dL      Total Cholesterol: 229 mg/dL    Patient Active Problem List   Diagnosis    Osteoporosis, postmenopausal    Vitamin D deficiency    Hypercalciuria    Medication monitoring encounter    Arthritis of carpometacarpal (CMC) joint of left thumb       Review of Systems   Constitutional: Negative for chills, fatigue, fever and unexpected weight change. HENT: Negative for congestion, ear pain, sore throat and trouble swallowing. Eyes: Negative for pain and redness. Respiratory: Negative for cough and shortness of breath. Cardiovascular: Negative for chest pain, palpitations and leg swelling.    Gastrointestinal: Negative for abdominal pain, blood in stool, constipation, diarrhea, nausea and vomiting. Endocrine: Negative for cold intolerance, heat intolerance, polydipsia and polyuria. Genitourinary: Negative for dysuria, frequency and hematuria. Musculoskeletal: Negative for arthralgias, back pain, joint swelling and myalgias. Skin: Negative for rash. Neurological: Negative for weakness, numbness and headaches. Hematological: Negative for adenopathy. Does not bruise/bleed easily. Psychiatric/Behavioral: Negative for sleep disturbance. The patient is not nervous/anxious. Prior to Visit Medications    Medication Sig Taking? Authorizing Provider   denosumab (PROLIA) 60 MG/ML SOSY SC injection Inject 1 mL into the skin once for 1 dose Yes Aden Sweeney MD   hydrochlorothiazide (MICROZIDE) 12.5 MG capsule TAKE 1 CAPSULE BY MOUTH EVERY MORNING Yes Aden Sweeney MD   Estradiol (VAGIFEM) 10 MCG TABS vaginal tablet Place 1 tablet vaginally Twice a Week  Yes Historical Provider, MD   Multiple Vitamins-Minerals (THERAPEUTIC MULTIVITAMIN-MINERALS) tablet Take 1 tablet by mouth daily.  Vit D 1000 units Yes Historical Provider, MD        No Known Allergies    Past Medical History:   Diagnosis Date    Arthritis     Club Foot     left    Osteoporosis        Past Surgical History:   Procedure Laterality Date     SECTION  1985    COLONOSCOPY  2019    COLONOSCOPY POLYPECTOMY SNARE/COLD BIOPSY performed by Tarun Knox MD at [address]    club foot surgery    TOE SURGERY Right     TONSILLECTOMY         Social History     Socioeconomic History    Marital status:      Spouse name: Not on file    Number of children: Not on file    Years of education: Not on file    Highest education level: Not on file   Occupational History    Occupation: RN   Social Needs    Financial resource strain: Not on file    Food insecurity     Worry: Not on file     Inability: Not on file    Transportation needs     Medical: Not on file     Non-medical: Not on file   Tobacco Use    Smoking status: Never Smoker    Smokeless tobacco: Never Used   Substance and Sexual Activity    Alcohol use: Yes     Comment: occasionally     Drug use: No    Sexual activity: Not on file   Lifestyle    Physical activity     Days per week: Not on file     Minutes per session: Not on file    Stress: Not on file   Relationships    Social connections     Talks on phone: Not on file     Gets together: Not on file     Attends Zoroastrian service: Not on file     Active member of club or organization: Not on file     Attends meetings of clubs or organizations: Not on file     Relationship status: Not on file    Intimate partner violence     Fear of current or ex partner: Not on file     Emotionally abused: Not on file     Physically abused: Not on file     Forced sexual activity: Not on file   Other Topics Concern    Not on file   Social History Narrative    Not on file        Family History   Problem Relation Age of Onset    High Blood Pressure Other        ADVANCE DIRECTIVE: N, <no information>    Vitals:    10/05/20 1016   BP: 113/77   Pulse: 80   Resp: 16   SpO2: 99%   Weight: 118 lb (53.5 kg)     Estimated body mass index is 22.3 kg/m² as calculated from the following:    Height as of 12/9/19: 5' 1\" (1.549 m). Weight as of this encounter: 118 lb (53.5 kg). Physical Exam  Constitutional:       Appearance: She is well-developed. HENT:      Head: Normocephalic and atraumatic. Right Ear: Tympanic membrane, ear canal and external ear normal.      Left Ear: Tympanic membrane, ear canal and external ear normal.      Nose: Nose normal.      Mouth/Throat:      Mouth: Mucous membranes are moist.      Pharynx: Oropharynx is clear. No oropharyngeal exudate or posterior oropharyngeal erythema. Eyes:      Conjunctiva/sclera: Conjunctivae normal.      Pupils: Pupils are equal, round, and reactive to light.    Cardiovascular:      Rate and Rhythm: Normal rate and regular rhythm. Heart sounds: Normal heart sounds. No murmur. Pulmonary:      Effort: Pulmonary effort is normal. No respiratory distress. Breath sounds: No wheezing or rales. Abdominal:      General: Bowel sounds are normal. There is no distension. Palpations: Abdomen is soft. Tenderness: There is no abdominal tenderness. There is no guarding. Musculoskeletal: Normal range of motion. General: No tenderness or deformity. Lymphadenopathy:      Cervical: No cervical adenopathy. Skin:     General: Skin is warm and dry. Capillary Refill: Capillary refill takes less than 2 seconds. Findings: No rash. Neurological:      Mental Status: She is alert. Cranial Nerves: No cranial nerve deficit. Psychiatric:         Behavior: Behavior normal.         Thought Content: Thought content normal.         Judgment: Judgment normal.         No flowsheet data found.     Lab Results   Component Value Date    CHOL 229 04/12/2018    CHOL 234 03/29/2017    CHOL 211 06/07/2013    TRIG 95 04/12/2018    TRIG 113 03/29/2017    TRIG 83 06/07/2013    HDL 85 04/12/2018    HDL 86 03/29/2017    HDL 85 06/07/2013    HDL 78 06/05/2012    HDL 81 06/09/2011    HDL 73 07/22/2010    LDLCALC 125 04/12/2018    LDLCALC 125 03/29/2017    LDLCALC 109 06/07/2013    GLUCOSE 93 05/21/2019       The 10-year ASCVD risk score (Bess Singh, et al., 2013) is: 3.5%    Values used to calculate the score:      Age: 59 years      Sex: Female      Is Non- : No      Diabetic: No      Tobacco smoker: No      Systolic Blood Pressure: 159 mmHg      Is BP treated: No      HDL Cholesterol: 85 mg/dL      Total Cholesterol: 229 mg/dL    Immunization History   Administered Date(s) Administered    Influenza Virus Vaccine 10/11/2017, 09/28/2018, 09/30/2019    Influenza, High Dose (Fluzone 65 yrs and older) 10/05/2018    Influenza, Quadv, IM, (6 mo and older Fluzone, Flulaval, Fluarix and 3 yrs and older Afluria) 10/11/2017    Zoster Recombinant (Shingrix) 08/01/2019, 10/24/2019       Health Maintenance   Topic Date Due    Hepatitis C screen  1955    HIV screen  12/01/1970    DTaP/Tdap/Td vaccine (1 - Tdap) 12/01/1974    Potassium monitoring  05/21/2020    Creatinine monitoring  05/21/2020    Flu vaccine (1) 09/01/2020    Breast cancer screen  01/21/2022    Cervical cancer screen  01/23/2023    Lipid screen  04/12/2023    Colon cancer screen colonoscopy  12/09/2029    Shingles Vaccine  Completed    Hepatitis A vaccine  Aged Out    Hepatitis B vaccine  Aged Out    Hib vaccine  Aged Out    Meningococcal (ACWY) vaccine  Aged Out    Pneumococcal 0-64 years Vaccine  Aged Out       ASSESSMENT/PLAN:  1. Encounter for well adult exam without abnormal findings  She is in excellent health. Blood pressure and weight are very good. Continue walking for exercise. Update preventative care  - Comprehensive Metabolic Panel    2. Needs flu shot    - Influenza, Quadv, 3 yrs and older, IM, PF, Prefill Syr or SDV, 0.5mL (Hillary Angel, ADRIANO)    3. Screening for lipid disorders    - Lipid Panel    4. Vitamin D deficiency  Recommend checking before upcoming Prolia injection including her renal function and calcium  - VITAMIN D 25 HYDROXY    5. Hypercalciuria     - Comprehensive Metabolic Panel    6. Osteoporosis, postmenopausal  labs  - Comprehensive Metabolic Panel      Return in about 1 year (around 10/5/2021). An electronic signature was used to authenticate this note.     --Grace Meadows MD on 10/5/2020 at 11:03 AM

## 2020-10-22 ENCOUNTER — NURSE ONLY (OUTPATIENT)
Dept: ENDOCRINOLOGY | Age: 65
End: 2020-10-22
Payer: COMMERCIAL

## 2020-10-22 PROCEDURE — 96372 THER/PROPH/DIAG INJ SC/IM: CPT | Performed by: INTERNAL MEDICINE

## 2020-10-22 NOTE — PROGRESS NOTES
Informed patient if any signs of redness,rash,swelling or unusual symptoms occur, please contact the office. Prolia given per physician order. Patient supplied the Prolia.

## 2020-11-30 ENCOUNTER — PROCEDURE VISIT (OUTPATIENT)
Dept: ENDOCRINOLOGY | Age: 65
End: 2020-11-30

## 2020-11-30 ENCOUNTER — HOSPITAL ENCOUNTER (OUTPATIENT)
Dept: GENERAL RADIOLOGY | Age: 65
Discharge: HOME | End: 2020-11-30
Payer: MEDICARE

## 2020-11-30 ENCOUNTER — OFFICE VISIT (OUTPATIENT)
Dept: ENDOCRINOLOGY | Age: 65
End: 2020-11-30
Payer: MEDICARE

## 2020-11-30 VITALS
HEIGHT: 61 IN | BODY MASS INDEX: 22.77 KG/M2 | SYSTOLIC BLOOD PRESSURE: 100 MMHG | WEIGHT: 120.6 LBS | DIASTOLIC BLOOD PRESSURE: 60 MMHG

## 2020-11-30 PROCEDURE — 77080 DXA BONE DENSITY AXIAL: CPT

## 2020-11-30 PROCEDURE — 3017F COLORECTAL CA SCREEN DOC REV: CPT | Performed by: INTERNAL MEDICINE

## 2020-11-30 PROCEDURE — G8427 DOCREV CUR MEDS BY ELIG CLIN: HCPCS | Performed by: INTERNAL MEDICINE

## 2020-11-30 PROCEDURE — 1036F TOBACCO NON-USER: CPT | Performed by: INTERNAL MEDICINE

## 2020-11-30 PROCEDURE — G8482 FLU IMMUNIZE ORDER/ADMIN: HCPCS | Performed by: INTERNAL MEDICINE

## 2020-11-30 PROCEDURE — G8420 CALC BMI NORM PARAMETERS: HCPCS | Performed by: INTERNAL MEDICINE

## 2020-11-30 PROCEDURE — 99214 OFFICE O/P EST MOD 30 MIN: CPT | Performed by: INTERNAL MEDICINE

## 2020-11-30 PROCEDURE — 77080 DXA BONE DENSITY AXIAL: CPT | Performed by: INTERNAL MEDICINE

## 2020-11-30 NOTE — PROGRESS NOTES
[facility] ([facility]) Osteoporosis and [address]  [address]  [address], [address]  Phone [phone]  Fax [phone]    NAME: Lesa Du OF BIRTH: 1955  INITIAL CONSULTATION: 05/10/2011  LAST OFFICE VISIT: 11/26/2019  TODAY'S VISIT: 11/30/2020    Labs @ [facility] 10/2020    PROBLEMS:   Osteoporosis by DXA 11/2010, lowest T-score -2.6 in the spine    Family history of osteoporosis, sister with a hip fracture, mother    Low bone density by DXA 10/2008, lowest T-score -1.8 in the spine    Fractured foot 2012 (slipped)    Natural menopause age 48 (2009)  Hypercalciuria, normal 25-h urine calcium for her is 100-215    330 mg/d10/2011 (she did not want to take HCTZ at that time    294 mg/d 08/2015 on no medication    172 mg/d 01/2016 with HCTZ 12.5 mg/d  Left club foot  Vitamin D, desirable 25-OH D is 30-60 ng/mL    30 ng/mL 08/2010 on 1600 IU/d    18 ng/mL 11/2010 on ergocalciferol 50,000 weekly (08/2010-11/2010)    34 ng/mL 07/2011 on cholecalciferol 1600 IU/d    39 ng/mL 07/2013 on vitamin D 1000 IU/d    45 ng/mL 08/2015 with vitamin D 1000 IU/d    47 ng/mL 10/2020    CURRENT MANAGEMENT FOR BONE HEALTH:   Calcium: 900 mg/d diet + 500 mg/d with multivitamin = 1400 mg/d    300 mg other, 300 mg milk, 300 mg yogurt  Vitamin D: 1000 IU/d in MVI  Exercise: walks outside 3 x weekly  Pharmacologic therapy:  Prolia 60 mg SQ twice yearly started 07/2014    PREVIOUS MEDICATIONS FOR OSTEOPOROSIS:   Fosamax 35 mg weekly 09/2009-04/2010, stopped by dentist  Alendronate 07/2013-07/2014, changed to Prolia with hopes of better BMD increase    OTHER CURRENT MEDICATIONS (SELECTED): None  OTC MEDICATIONS: None    CHIEF COMPLAINT: Here for f/u visit of osteoporosis and vitamin D deficiency, monitoring treatment. No new related signs or symptoms.     PAST MEDICAL HISTORY, FAMILY HISTORY, SOCIAL HISTORY AND REVIEW OF SYSTEMS:  Relevant changes since last visit (see patient questionnaire of todays date).    INTERVAL HISTORY: See problem list for chronic/inactive conditions. She received Prolia and continued HCTZ without side effects. No falls, near-falls or fractures. Feels well overall. 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 No apparent focal motor or sensory deficit. Reflexes brisk and symmetric. Coordination appears normal.  MUSCULOSKELETAL EXAM: Gait: Intact without difficulty. Steady without assistance. Spine: Spinal contours are normal.  No spine tenderness to palpation or percussion. Ribs and pelvis: Ribs appear normal. Two finger spaces between ribs and pelvis. BONE DENSITY: Most recent done here using Hologic equipment. T-scores  Initial study: 10/17/2008 L1-L4 -2.9 left fem. neck NA   Current study: 11/30/2020 L1-L4 -2.3 left fem. neck -2.2     The table below shows bone mineral density (grams/cm2), the appropriate measure for comparing serial scans. An increase or decrease is significant based on precision studies done at our center according to the ISCD protocol. PA spine Proximal Femur (left)   Date L1-L4 Fem. neck Trochanter Total hip   10/17/2008 0.850 Invalid Invalid Invalid   11/22/2010 0.762 0.567 0.563 Invalid   11/09/2011 0.733 0.587 0.561 0.716   07/09/2013 0.699 0.563 0.540 0.675   07/22/2014 0.703 0.546 0.550 0.690   07/28/2015 0.731 0.558 0.582 0.716   08/16/2016 0.764 0.562 0.606 0.729   08/29/2017 0.748 0.584 0.610 0.737   09/24/2018 0.743 0.587 0.600 0.728   11/26/2019 0.782 0.580 0.628 0.753   11/30/2020 0.795 0.600 0.632 0.753     IMPRESSION:  BONE DENSITY IS LOW, CONSISTENT WITH OSTEOPOROSIS. COMPARED WITH 2014, BEFORE STARTING PROLIA, BMD IS HIGHER NOW AT ALL SITES MEASURED. SINCE THE LAST DXA, BMD DID NOT CHANGE SIGNIFICANTLY IN THE SPINE OR LEFT HIP.       LABS. 01/2016 Ca 10.0, Cr 0.8. 03/2016 CBC Ca 9.8 Cr 0.6. 07/2018 Ca 9.4 Cr 0.7. 05/2019 Ca 9.6 Cr 0.7.  10/2020 Ca 9.6 Cr 0.6. IMAGING. DXA printouts reviewed. 03/2016 chest CT.    ASSESSMENT: Osteoporosis with bone density lower than desirable due in part to her family history. BMD decreased from 2008 to 11/2010 at the spine. Although stable since, BMD is still quite low. She is doing well with Prolia started 07/2014. Vitamin D deficiency has been corrected. Hypercalciuria is controlled with HCTZ 12.5 mg/d. PLANS: Continue HCTZ 12.5 mg/d. Continue Prolia 60 mg SQ twice yearly (next dose scheduled 04/28/2021). Return appointment with DXA in 1 year. I spent 25 minutes face to face with this patient. Over 50% of that time was spent on counseling and care coordination. See assessment and plan for counseling and care coordination details. Kenna Clark MD, Director, [facility]) Osteoporosis and Bone Health Services    CC: Holly Michaud MD

## 2020-12-23 ENCOUNTER — TELEPHONE (OUTPATIENT)
Dept: FAMILY MEDICINE CLINIC | Age: 65
End: 2020-12-23

## 2020-12-23 NOTE — TELEPHONE ENCOUNTER
Ok to schedule at 9:40 am tomorrow morning    Please document call and then close encounter.   thanks

## 2020-12-24 ENCOUNTER — OFFICE VISIT (OUTPATIENT)
Dept: FAMILY MEDICINE CLINIC | Age: 65
End: 2020-12-24
Payer: MEDICARE

## 2020-12-24 VITALS
DIASTOLIC BLOOD PRESSURE: 74 MMHG | RESPIRATION RATE: 16 BRPM | TEMPERATURE: 97.2 F | BODY MASS INDEX: 22.69 KG/M2 | HEART RATE: 89 BPM | OXYGEN SATURATION: 99 % | SYSTOLIC BLOOD PRESSURE: 112 MMHG | HEIGHT: 61 IN | WEIGHT: 120.2 LBS

## 2020-12-24 PROCEDURE — 99213 OFFICE O/P EST LOW 20 MIN: CPT | Performed by: FAMILY MEDICINE

## 2020-12-24 NOTE — PROGRESS NOTES
PROGRESS NOTE     Eliazar Bishop MD  [address]  [place]ameron Perera [place] [address]  [phone] office  [phone] fax    Date of Service:  2020    Subjective:      Patient ID: . Mel Martinez is a 72 y.o. female      CC: left rib cage pain    HPI      40-year-old white female presenting with what she believes is an asymmetry on her left lower rib cage. She intermittently has some mild tenderness to palpation about 2 inches from the medial border of her lower rib cage. She wanted to make sure this is nothing to worry about. She does do breast exams and denies any breast mass, skin changes, axillary lymphadenopathy, nipple discharge. She is up-to-date with mammogram, and has never had an abnormal one. She denies any upper or lower respiratory symptoms. See below. Vitals:    20 0932   BP: 112/74   Pulse: 89   Resp: 16   Temp: 97.2 °F (36.2 °C)   TempSrc: Infrared   SpO2: 99%   Weight: 120 lb 3.2 oz (54.5 kg)   Height: 5' 0.98\" (1.549 m)       Outpatient Medications Marked as Taking for the 20 encounter (Office Visit) with Ameena Austin MD   Medication Sig Dispense Refill    hydrochlorothiazide (MICROZIDE) 12.5 MG capsule TAKE 1 CAPSULE BY MOUTH EVERY MORNING 90 capsule 3    Estradiol (VAGIFEM) 10 MCG TABS vaginal tablet Place 1 tablet vaginally Twice a Week       Multiple Vitamins-Minerals (THERAPEUTIC MULTIVITAMIN-MINERALS) tablet Take 1 tablet by mouth daily.  Vit D 1000 units         Past Medical History:   Diagnosis Date    Arthritis     Club Foot     left    Osteoporosis        Past Surgical History:   Procedure Laterality Date     SECTION      COLONOSCOPY  2019    COLONOSCOPY POLYPECTOMY SNARE/COLD BIOPSY performed by Jasper Covarrubias MD at [address]    club foot surgery    TOE SURGERY Right     TONSILLECTOMY         Social History     Tobacco Use    Smoking status: Never Smoker  Smokeless tobacco: Never Used   Substance Use Topics    Alcohol use: Yes     Comment: occasionally        Family History   Problem Relation Age of Onset    High Blood Pressure Other            Review of Systems  Constitutional:  Negative for activity or appetite change, fever or fatigue  Resp:  Negative for SOB, chest tightness, cough  Cardiovascular: Negative for CP, palpitations, HAWK  Gastrointestinal: Negative for abd pain, melena, BRBPR, N/V/D  Musculoskeletal:  Negative for back pain or myalgias  Neuro:  Negative for dizziness or lightheadedness  Psych: negative for depression or anxiety      Objective:   Constitutional:   · Reviewed vitals above  · Well Nourished, well developed, no distress       Resp:  · Normal effort  · Clear to auscultation bilaterally without rhonchi, wheezing or crackles  Gastrointestinal:  · Nontender, nondistended, and no masses  · No hepatosplenomegaly  Breast exam  · No masses, skin changes, nipple discharge, asymmetries or axillary LNs bilaterally  Musculoskeletal:  · Mild asymmetry of left lower rib cage  Compared to right (contours of ribs are mildly different  · Mild tenderness of palpation of costchondral joints of left lower rib cage  · Normal Gait  · All extremities without clubbing, cyanosis or edema  Skin:  · No rashes on inspection  · No areas of increased heat or induration on palpation  Psych:  · Normal mood and affect  · Normal insight and judgement    Assessment / Plan:     1. Rib pain on left side  Normal breast exam.  Discussed that bony asymmetries from the left to the right side of the body are normal, and based on exam today nothing to be concerned about. Checking x-ray to rule out any bony pathology, but based on exam likely has costochondritis. Reviewed pathophysiology of this diagnosis, and recommended treatment with relative rest, NSAIDs, ice.  - XR CHEST STANDARD (2 VW);  Future

## 2021-02-01 NOTE — TELEPHONE ENCOUNTER
Spoke to patient who confirms she is now on Medicare (see card in Media tab). She is unsure who her preferred pharmacy is - she would prefer [facility] to if possible. [facility] will reach out to patient in April to see if they can fill or if they need to transfer to new pharmacy.

## 2021-02-05 ENCOUNTER — IMMUNIZATION (OUTPATIENT)
Dept: PRIMARY CARE CLINIC | Age: 66
End: 2021-02-05
Payer: MEDICARE

## 2021-02-05 PROCEDURE — 91301 COVID-19, MODERNA VACCINE 100MCG/0.5ML DOSE: CPT | Performed by: FAMILY MEDICINE

## 2021-02-05 PROCEDURE — 0011A COVID-19, MODERNA VACCINE 100MCG/0.5ML DOSE: CPT | Performed by: FAMILY MEDICINE

## 2021-03-05 ENCOUNTER — IMMUNIZATION (OUTPATIENT)
Dept: PRIMARY CARE CLINIC | Age: 66
End: 2021-03-05
Payer: MEDICARE

## 2021-03-05 PROCEDURE — 0012A COVID-19, MODERNA VACCINE 100MCG/0.5ML DOSE: CPT | Performed by: FAMILY MEDICINE

## 2021-03-05 PROCEDURE — 91301 COVID-19, MODERNA VACCINE 100MCG/0.5ML DOSE: CPT | Performed by: FAMILY MEDICINE

## 2021-03-17 ENCOUNTER — TELEPHONE (OUTPATIENT)
Dept: ENDOCRINOLOGY | Age: 66
End: 2021-03-17

## 2021-03-17 NOTE — TELEPHONE ENCOUNTER
Spoke with patient related to prolia and insurance. Patient has Medicare as primary and BCBS as secondary. Spoke with Emma Hamlin (PA dept.) and she will send a PA request to Lizz Ramos.

## 2021-03-17 NOTE — TELEPHONE ENCOUNTER
Patient called and said she needs an order put in for prolia she stated her appt is at the end of April.  And talk to desire about changing her pharmacy

## 2021-04-06 ENCOUNTER — TELEPHONE (OUTPATIENT)
Dept: ENDOCRINOLOGY | Age: 66
End: 2021-04-06

## 2021-04-06 NOTE — TELEPHONE ENCOUNTER
Patient stated that she has medicare and bcbs [employer] and [employer] portal will not let me access this insurance.  Information sent to [facility] for a possible PA request.

## 2021-04-07 ENCOUNTER — TELEPHONE (OUTPATIENT)
Dept: ENDOCRINOLOGY | Age: 66
End: 2021-04-07

## 2021-04-07 RX ORDER — DENOSUMAB 60 MG/ML
60 INJECTION SUBCUTANEOUS ONCE
Qty: 1 ML | Refills: 0 | Status: SHIPPED | OUTPATIENT
Start: 2021-04-07 | End: 2022-10-21

## 2021-04-07 NOTE — TELEPHONE ENCOUNTER
PA approved:    Daniel Flores Scott: [facility] AT [facility] - PA Case ID: [id number] - Rx #: [id number] Need help?  Call us at [phone]   Outcome   Approvedtoday   PA Case: [id number], Status: Approved, Coverage Starts on: 1/6/2021 12:00:00 AM, Coverage Ends on: 4/7/2022 12:00:00 AM.

## 2021-04-28 ENCOUNTER — NURSE ONLY (OUTPATIENT)
Dept: ENDOCRINOLOGY | Age: 66
End: 2021-04-28
Payer: MEDICARE

## 2021-04-28 DIAGNOSIS — M81.0 OSTEOPOROSIS, POSTMENOPAUSAL: Chronic | ICD-10-CM

## 2021-04-28 PROCEDURE — 96372 THER/PROPH/DIAG INJ SC/IM: CPT | Performed by: INTERNAL MEDICINE

## 2021-05-24 ENCOUNTER — OFFICE VISIT (OUTPATIENT)
Dept: DERMATOLOGY | Age: 66
End: 2021-05-24
Payer: MEDICARE

## 2021-05-24 VITALS — TEMPERATURE: 98 F

## 2021-05-24 DIAGNOSIS — L60.2 NAIL THICKENING: ICD-10-CM

## 2021-05-24 DIAGNOSIS — D22.9 MULTIPLE NEVI: Primary | ICD-10-CM

## 2021-05-24 DIAGNOSIS — L30.9 DERMATITIS: ICD-10-CM

## 2021-05-24 DIAGNOSIS — L81.4 LENTIGINES: ICD-10-CM

## 2021-05-24 DIAGNOSIS — L57.0 AK (ACTINIC KERATOSIS): ICD-10-CM

## 2021-05-24 PROCEDURE — 99213 OFFICE O/P EST LOW 20 MIN: CPT | Performed by: DERMATOLOGY

## 2021-05-24 NOTE — PROGRESS NOTES
[facility]  Niya Delacruz MD  [address] [facility] [address]  1955    72 y.o. female     Date of Visit: 2021    Chief Complaint: moles/lesions  Chief Complaint   Patient presents with    Skin Exam     tbse     Last seen: -  *retired in the past year  *saw Dr. Ron Lara in the past    *sister diagnosed with breast cancer in 2019    History of Present Illness:    1. Here for evaluation of multiple asx pigmented lesions on the trunk and extremities, present for many years; no change in size/shape/color of any lesions; no bleeding lesions. 2. Hx AK. Few new lesions note on the nose. Asx.     3. F/u dry hands, fissuring fingertips. Worse with hand  at work. Has tried moisturizer. No significant worsening. 4. She notes thickening of the L 2nd toenail over the past year. Walking more. Asx. No trx tried. Concerned about fungus. No personal hx of skin cancer. She wears sunscreen regularly. Mother with hx of NMSC. Hx of many sunburns as a child/teen. Used tanning beds a few times in the past but none recently. 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  Retired in . She has 2 granddaughters-Brandy and Lj Warsaw - 1st and 3rd grade at [employer]. I in [phone]. Review of Systems:  Gen: Feels well, good sense of health. Skin: No changing moles or lesions. Past Medical History, Family History, Surgical History, Medications and Allergies reviewed.     Past Medical History:   Diagnosis Date    Arthritis    [address]    left    Osteoporosis        Past Surgical History:   Procedure Laterality Date     SECTION  1985    COLONOSCOPY  2019    COLONOSCOPY POLYPECTOMY SNARE/COLD BIOPSY performed by Raj Patel MD at [address]    club foot surgery    TOE SURGERY Right     TONSILLECTOMY         Outpatient Medications Marked as Taking for the 21 encounter (Office Visit) with Hans Desir MD Medication Sig Dispense Refill    hydroCHLOROthiazide (MICROZIDE) 12.5 MG capsule TAKE ONE CAPSULE BY MOUTH EVERY MORNING 90 capsule 3    Estradiol (VAGIFEM) 10 MCG TABS vaginal tablet Place 1 tablet vaginally Twice a Week       Multiple Vitamins-Minerals (THERAPEUTIC MULTIVITAMIN-MINERALS) tablet Take 1 tablet by mouth daily. Vit D 1000 units         No Known Allergies      Physical Examination     Gen, well-appearing  FSE today except for underwear-covered areas    trunk and extremities with scattered brown macules and papules - darkest on the chest (photo below) - all stable in appearance compared to photo  No AK's today  Fingers, webs with dry skin and mild erythema  L 2nd toe with thickening nail plate and 1 mm splinter hemorrhage    3-2015:        Assessment and Plan     1. Benign-appearing nevi and lentigines  2. Hx of AK - clear today  - educ re ABCD's of MM   educ sun protection SPF 30+  encouraged skin check yearly (sooner if indicated), self checks   - monitor lesions on the chest    3. Hand and fingertip dermatitis - irritant/from dryness - stable/mild  - ed DSC  - aquaphor with nitrile gloves qhs  - call for TAC if worsening    4. L 2nd toe - frictional nail thickening  - reassured and discussed etiology and likely exacerbated by increase in exercise    Ed /trx x 2-3 for the chest if desired. F/u 1 year.

## 2021-05-26 ENCOUNTER — HOSPITAL ENCOUNTER (OUTPATIENT)
Dept: WOMENS IMAGING | Age: 66
Discharge: HOME | End: 2021-05-26
Payer: MEDICARE

## 2021-05-26 ENCOUNTER — HOSPITAL ENCOUNTER (OUTPATIENT)
Age: 66
Discharge: HOME | End: 2021-05-26
Payer: MEDICARE

## 2021-05-26 ENCOUNTER — HOSPITAL ENCOUNTER (OUTPATIENT)
Dept: GENERAL RADIOLOGY | Age: 66
Discharge: HOME | End: 2021-05-26
Payer: MEDICARE

## 2021-05-26 DIAGNOSIS — R07.81 RIB PAIN ON LEFT SIDE: ICD-10-CM

## 2021-05-26 DIAGNOSIS — Z12.31 VISIT FOR SCREENING MAMMOGRAM: ICD-10-CM

## 2021-05-26 PROCEDURE — 77063 BREAST TOMOSYNTHESIS BI: CPT

## 2021-05-26 PROCEDURE — 71046 X-RAY EXAM CHEST 2 VIEWS: CPT

## 2021-09-01 ENCOUNTER — OFFICE VISIT (OUTPATIENT)
Dept: ORTHOPEDIC SURGERY | Age: 66
End: 2021-09-01
Payer: MEDICARE

## 2021-09-01 VITALS — BODY MASS INDEX: 23.56 KG/M2 | WEIGHT: 120 LBS | HEIGHT: 60 IN | RESPIRATION RATE: 16 BRPM

## 2021-09-01 DIAGNOSIS — M72.2 PLANTAR FASCIITIS OF RIGHT FOOT: Primary | ICD-10-CM

## 2021-09-01 PROCEDURE — 99203 OFFICE O/P NEW LOW 30 MIN: CPT | Performed by: ORTHOPAEDIC SURGERY

## 2021-09-01 RX ORDER — NAPROXEN 500 MG/1
500 TABLET ORAL 2 TIMES DAILY WITH MEALS
Qty: 60 TABLET | Refills: 1 | Status: SHIPPED | OUTPATIENT
Start: 2021-09-01 | End: 2021-10-12 | Stop reason: ALTCHOICE

## 2021-09-03 ENCOUNTER — TELEPHONE (OUTPATIENT)
Dept: FAMILY MEDICINE CLINIC | Age: 66
End: 2021-09-03

## 2021-09-03 NOTE — TELEPHONE ENCOUNTER
Pt returning call. She asked if Dr Brad Baez was leaving, and that was the reason for the call. I informed her that the call was to schedule the medicare annual wellness visit. I did inform her that Dr Brad Baez was in fact leaving the practice. I let her know we could schedule with dr David Sevilla.  She said she would think about it and call the office back

## 2021-09-04 PROBLEM — M72.2 PLANTAR FASCIITIS OF RIGHT FOOT: Status: ACTIVE | Noted: 2021-09-04

## 2021-09-27 ENCOUNTER — TELEPHONE (OUTPATIENT)
Dept: ENDOCRINOLOGY | Age: 66
End: 2021-09-27

## 2021-10-12 ENCOUNTER — OFFICE VISIT (OUTPATIENT)
Dept: FAMILY MEDICINE CLINIC | Age: 66
End: 2021-10-12
Payer: MEDICARE

## 2021-10-12 VITALS
DIASTOLIC BLOOD PRESSURE: 68 MMHG | SYSTOLIC BLOOD PRESSURE: 108 MMHG | HEIGHT: 60 IN | BODY MASS INDEX: 23.16 KG/M2 | WEIGHT: 118 LBS | HEART RATE: 68 BPM

## 2021-10-12 DIAGNOSIS — E55.9 VITAMIN D DEFICIENCY: ICD-10-CM

## 2021-10-12 DIAGNOSIS — R82.994 HYPERCALCIURIA: ICD-10-CM

## 2021-10-12 DIAGNOSIS — Z00.00 ENCOUNTER FOR WELL ADULT EXAM WITHOUT ABNORMAL FINDINGS: Primary | ICD-10-CM

## 2021-10-12 DIAGNOSIS — F41.9 ANXIETY: ICD-10-CM

## 2021-10-12 DIAGNOSIS — Z13.220 SCREENING FOR LIPID DISORDERS: ICD-10-CM

## 2021-10-12 PROCEDURE — 90694 VACC AIIV4 NO PRSRV 0.5ML IM: CPT | Performed by: INTERNAL MEDICINE

## 2021-10-12 PROCEDURE — 99397 PER PM REEVAL EST PAT 65+ YR: CPT | Performed by: INTERNAL MEDICINE

## 2021-10-12 PROCEDURE — G0008 ADMIN INFLUENZA VIRUS VAC: HCPCS | Performed by: INTERNAL MEDICINE

## 2021-10-12 RX ORDER — SERTRALINE HYDROCHLORIDE 25 MG/1
25 TABLET, FILM COATED ORAL DAILY
Qty: 30 TABLET | Refills: 1 | Status: SHIPPED | OUTPATIENT
Start: 2021-10-12 | End: 2022-07-26

## 2021-10-12 RX ORDER — DIAZEPAM 2 MG/1
2 TABLET ORAL NIGHTLY PRN
Qty: 5 TABLET | Refills: 1 | Status: SHIPPED | OUTPATIENT
Start: 2021-10-12 | End: 2021-10-17

## 2021-10-12 ASSESSMENT — ENCOUNTER SYMPTOMS
EYE PAIN: 0
NAUSEA: 0
ABDOMINAL PAIN: 0
SORE THROAT: 0
EYE REDNESS: 0
COUGH: 0
CONSTIPATION: 0
DIARRHEA: 0
TROUBLE SWALLOWING: 0
VOMITING: 0
BLOOD IN STOOL: 0
BACK PAIN: 0
SHORTNESS OF BREATH: 0

## 2021-10-12 ASSESSMENT — PATIENT HEALTH QUESTIONNAIRE - PHQ9
1. LITTLE INTEREST OR PLEASURE IN DOING THINGS: 0
SUM OF ALL RESPONSES TO PHQ9 QUESTIONS 1 & 2: 0
2. FEELING DOWN, DEPRESSED OR HOPELESS: 0
SUM OF ALL RESPONSES TO PHQ QUESTIONS 1-9: 0

## 2021-10-12 NOTE — PROGRESS NOTES
Vaccine Information Sheet, \"Influenza - Inactivated\" OR \"Live - Intranasal\"  given to Flo Ames. Patient responses:    Have you ever had a reaction to a flu vaccine? No  Are you able to eat eggs without adverse effects? Yes  Do you have any current illness? No  Have you ever had [place] Syndrome? No    Flu vaccine given per order. Please see immunization tab.

## 2021-10-12 NOTE — PROGRESS NOTES
10/12/2021    Abby Diaz (:  1955) is a 72 y.o. female, here for a preventive medicine evaluation. Feeling well physically. Did get plantar fasciitis - has already seen dr Catia Murillo. Getting new shoes. Reports recent issues with stress/anxiety. 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 Recent cancelled vacation that she was looking forward to. New grandchild unexpectedly expected! 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 Latrell 7 - 4  No depression/si    Patient Active Problem List   Diagnosis    Osteoporosis, postmenopausal    Vitamin D deficiency    Hypercalciuria    Medication monitoring encounter    Arthritis of carpometacarpal (CMC) joint of left thumb    Plantar fasciitis of right foot       Review of Systems   Constitutional: Negative for chills, fatigue, fever and unexpected weight change. HENT: Negative for congestion, ear pain, sore throat and trouble swallowing. Eyes: Negative for pain and redness. Respiratory: Negative for cough and shortness of breath. Cardiovascular: Negative for chest pain, palpitations and leg swelling. Gastrointestinal: Negative for abdominal pain, blood in stool, constipation, diarrhea, nausea and vomiting. Endocrine: Negative for cold intolerance, heat intolerance, polydipsia and polyuria. Genitourinary: Negative for dysuria, frequency and hematuria. Musculoskeletal: Negative for arthralgias, back pain, joint swelling and myalgias. Skin: Negative for rash. Neurological: Negative for weakness, numbness and headaches. Hematological: Negative for adenopathy. Does not bruise/bleed easily. Psychiatric/Behavioral: Negative for dysphoric mood, sleep disturbance and suicidal ideas. The patient is nervous/anxious. Prior to Visit Medications    Medication Sig Taking?  Authorizing Provider   diazePAM (VALIUM) 2 MG tablet Take 1 tablet by mouth nightly as needed for Anxiety or Sleep for up to 5 days. Yes Tevin Lugo MD   sertraline (ZOLOFT) 25 MG tablet Take 1 tablet by mouth daily Yes Tevin Lugo MD   hydroCHLOROthiazide (MICROZIDE) 12.5 MG capsule TAKE ONE CAPSULE BY MOUTH EVERY MORNING Yes Emily Omalley MD   denosumab (PROLIA) 60 MG/ML SOSY SC injection Inject 1 mL into the skin once for 1 dose Yes Emily Omalley MD   Estradiol (VAGIFEM) 10 MCG TABS vaginal tablet Place 1 tablet vaginally Twice a Week  Yes Historical Provider, MD   Multiple Vitamins-Minerals (THERAPEUTIC MULTIVITAMIN-MINERALS) tablet Take 1 tablet by mouth daily. Vit D 1000 units Yes Historical Provider, MD        No Known Allergies    Past Medical History:   Diagnosis Date    Arthritis    [address]    left    Osteoporosis        Past Surgical History:   Procedure Laterality Date     SECTION  1985    COLONOSCOPY  2019    COLONOSCOPY POLYPECTOMY SNARE/COLD BIOPSY performed by Joaquin Roblero MD at [address]    club foot surgery    TOE SURGERY Right     TONSILLECTOMY         Social History     Socioeconomic History    Marital status:      Spouse name: Not on file    Number of children: Not on file    Years of education: Not on file    Highest education level: Not on file   Occupational History    Occupation: RN   Tobacco Use    Smoking status: Never Smoker    Smokeless tobacco: Never Used   Vaping Use    Vaping Use: Never used   Substance and Sexual Activity    Alcohol use: Yes     Comment: occasionally     Drug use: No    Sexual activity: Not on file   Other Topics Concern    Not on file   Social History Narrative    Not on file     Social Determinants of Health     Financial Resource Strain:     Difficulty of Paying Living Expenses:    Food Insecurity:     Worried About Running Out of Food in the Last Year:     [address] in the Last Year:    Transportation Needs:     Lack of Transportation (Medical):      Lack of Transportation (Non-Medical):    Physical Activity:     Days of Exercise per Week:     Minutes of Exercise per Session:    Stress:     Feeling of Stress :    Social Connections:     Frequency of Communication with Friends and Family:     Frequency of Social Gatherings with Friends and Family:     Attends Mosque Services:     Active Member of Clubs or Organizations:     Attends Club or Organization Meetings:     Marital Status:    Intimate Partner Violence:     Fear of Current or Ex-Partner:     Emotionally Abused:     Physically Abused:     Sexually Abused:         Family History   Problem Relation Age of Onset    High Blood Pressure Other        ADVANCE DIRECTIVE: N, <no information>    Vitals:    10/12/21 1043   BP: 108/68   Pulse: 68   Weight: 118 lb (53.5 kg)   Height: 5' (1.524 m)     Estimated body mass index is 23.05 kg/m² as calculated from the following:    Height as of this encounter: 5' (1.524 m). Weight as of this encounter: 118 lb (53.5 kg). Physical Exam  Constitutional:       General: She is not in acute distress. Appearance: She is well-developed. HENT:      Head: Normocephalic and atraumatic. Right Ear: Tympanic membrane, ear canal and external ear normal.      Left Ear: Tympanic membrane, ear canal and external ear normal.      Nose: Nose normal. No congestion or rhinorrhea. Eyes:      General:         Right eye: No discharge. Left eye: No discharge. Conjunctiva/sclera: Conjunctivae normal.      Pupils: Pupils are equal, round, and reactive to light. Cardiovascular:      Rate and Rhythm: Normal rate and regular rhythm. Heart sounds: Normal heart sounds. No murmur heard. Pulmonary:      Effort: Pulmonary effort is normal. No respiratory distress. Breath sounds: No wheezing or rales. Abdominal:      General: Bowel sounds are normal. There is no distension. Palpations: Abdomen is soft. Tenderness:  There is no abdominal tenderness. There is no guarding. Musculoskeletal:         General: No tenderness or deformity. Normal range of motion. Lymphadenopathy:      Cervical: No cervical adenopathy. Skin:     General: Skin is warm and dry. Capillary Refill: Capillary refill takes less than 2 seconds. Findings: No rash. Neurological:      General: No focal deficit present. Mental Status: She is alert. Cranial Nerves: No cranial nerve deficit. Psychiatric:         Mood and Affect: Mood normal.         Behavior: Behavior normal.         Thought Content: Thought content normal.         Judgment: Judgment normal.         No flowsheet data found.     Lab Results   Component Value Date    CHOL 254 10/05/2020    CHOL 229 04/12/2018    CHOL 234 03/29/2017    TRIG 103 10/05/2020    TRIG 95 04/12/2018    TRIG 113 03/29/2017    HDL 97 10/05/2020    HDL 85 04/12/2018    HDL 86 03/29/2017    HDL 78 06/05/2012    HDL 81 06/09/2011    HDL 73 07/22/2010    LDLCALC 136 10/05/2020    LDLCALC 125 04/12/2018    LDLCALC 125 03/29/2017    GLUCOSE 92 10/05/2020       The 10-year ASCVD risk score (Zach Pandya et al., 2013) is: 3.6%    Values used to calculate the score:      Age: 72 years      Sex: Female      Is Non- : No      Diabetic: No      Tobacco smoker: No      Systolic Blood Pressure: 705 mmHg      Is BP treated: No      HDL Cholesterol: 97 mg/dL      Total Cholesterol: 254 mg/dL    Immunization History   Administered Date(s) Administered    COVID-19, Moderna, PF, 100mcg/0.5mL 02/05/2021, 03/05/2021    Influenza A (Y0T6-42) Vaccine PF IM 10/21/2009    Influenza Virus Vaccine 10/11/2017, 09/28/2018, 09/30/2019    Influenza, High Dose (Fluzone 65 yrs and older) 10/05/2018    Influenza, Quadv, IM, (6 mo and older Fluzone, Flulaval, Fluarix and 3 yrs and older Afluria) 10/11/2017    Influenza, Quadv, IM, PF (6 mo and older Fluzone, Flulaval, Fluarix, and 3 yrs and older Afluria) 10/05/2020    Influenza, Quadv, adjuvanted, 65 yrs +, IM, PF (Fluad) 10/12/2021    Tdap (Boostrix, Adacel) 10/05/2020    Zoster Recombinant (Shingrix) 08/01/2019, 10/24/2019       Health Maintenance   Topic Date Due    Hepatitis C screen  Never done    HIV screen  Never done   [employer] Visit (AWV)  Never done    Pneumococcal 65+ years Vaccine (1 of 1 - PPSV23) Never done    Potassium monitoring  10/05/2021    Creatinine monitoring  10/05/2021    Cervical cancer screen  01/23/2023    Breast cancer screen  05/26/2023    Lipid screen  10/05/2025    Colon cancer screen colonoscopy  12/09/2029    DTaP/Tdap/Td vaccine (2 - Td or Tdap) 10/05/2030    DEXA (modify frequency per FRAX score)  Completed    Flu vaccine  Completed    Shingles Vaccine  Completed    COVID-19 Vaccine  Completed    Hepatitis A vaccine  Aged Out    Hepatitis B vaccine  Aged Out    Hib vaccine  Aged Out    Meningococcal (ACWY) vaccine  Aged Out          ASSESSMENT/PLAN:  1. Encounter for well adult exam without abnormal findings  Prev care UTD. Would chau to defer pneumovax today. Flu shot given   2. Vitamin D deficiency  -     VITAMIN D 25 HYDROXY; Future  3. Hypercalciuria  F/u labs. On prolia   -     Comprehensive Metabolic Panel; Future  4. Screening for lipid disorders  -     Lipid Panel; Future  5. Anxiety  Discussed her symptoms at length. Some situational stress. Discussed cbt verus medication. Thinks she may benefit from intermittent anxiolytic due to sleep issues - will provide limited rx, aware of habit forming effect. Also discussed sertraline/ssri. She will trial - has been on fluoxetine in the past. Will also see our therapist - may hold off on ssri if this is beneficial however. Seeing new PMD this week and will discuss fu plan then - she is considering all her options at present  -     diazePAM (VALIUM) 2 MG tablet; Take 1 tablet by mouth nightly as needed for Anxiety or Sleep for up to 5 days. , Disp-5 tablet, R-1Normal  -     Ambulatory referral to Psychology      Return in about 1 year (around 10/12/2022). An electronic signature was used to authenticate this note.     --Jessica Bear MD on 10/12/2021 at 12:08 PM

## 2021-10-12 NOTE — PATIENT INSTRUCTIONS
Patient Education        Well Visit, Over 72: Care Instructions  Overview     Well visits can help you stay healthy. Your doctor has checked your overall health and may have suggested ways to take good care of yourself. Your doctor also may have recommended tests. At home, you can help prevent illness with healthy eating, regular exercise, and other steps. Follow-up care is a key part of your treatment and safety. Be sure to make and go to all appointments, and call your doctor if you are having problems. It's also a good idea to know your test results and keep a list of the medicines you take. How can you care for yourself at home? · Get screening tests that you and your doctor decide on. Screening helps find diseases before any symptoms appear. · Eat healthy foods. Choose fruits, vegetables, whole grains, protein, and low-fat dairy foods. Limit fat, especially saturated fat. Reduce salt in your diet. · Limit alcohol. If you are a man, have no more than 2 drinks a day or 14 drinks a week. If you are a woman, have no more than 1 drink a day or 7 drinks a week. Since alcohol affects older adults differently, you may want to limit alcohol even more. Or you may not want to drink at all. · Get at least 30 minutes of exercise on most days of the week. Walking is a good choice. You also may want to do other activities, such as running, swimming, cycling, or playing tennis or team sports. · Reach and stay at a healthy weight. This will lower your risk for many problems, such as obesity, diabetes, heart disease, and high blood pressure. · Do not smoke. Smoking can make health problems worse. If you need help quitting, talk to your doctor about stop-smoking programs and medicines. These can increase your chances of quitting for good. · Care for your mental health. It is easy to get weighed down by worry and stress.  Learn strategies to manage stress, like deep breathing and mindfulness, and stay connected with your family and community. If you find you often feel sad or hopeless, talk with your doctor. Treatment can help. · Talk to your doctor about whether you have any risk factors for sexually transmitted infections (STIs). You can help prevent STIs if you wait to have sex with a new partner (or partners) until you've each been tested for STIs. It also helps if you use condoms (male or female condoms) and if you limit your sex partners to one person who only has sex with you. Vaccines are available for some STIs. · If you think you may have a problem with alcohol or drug use, talk to your doctor. This includes prescription medicines (such as amphetamines and opioids) and illegal drugs (such as cocaine and methamphetamine). Your doctor can help you figure out what type of treatment is best for you. · Protect your skin from too much sun. When you're outdoors from 10 a.m. to 4 p.m., stay in the shade or cover up with clothing and a hat with a wide brim. Wear sunglasses that block UV rays. Even when it's cloudy, put broad-spectrum sunscreen (SPF 30 or higher) on any exposed skin. · See a dentist one or two times a year for checkups and to have your teeth cleaned. · Wear a seat belt in the car. When should you call for help? Watch closely for changes in your health, and be sure to contact your doctor if you have any problems or symptoms that concern you. Where can you learn more? Go to [email or web address] org and sign in to your [employer] account. Enter H968 in the [employer] box to learn more about \"Well Visit, Over 65: Care Instructions. \"     If you do not have an account, please click on the \"Sign Up Now\" link. Current as of: February 11, 2021               Content Version: 13.0  © 5098-2581 [employer]wise, Inc[employer]. Care instructions adapted under license by [facility] ([facility]).  If you have questions about a medical condition or this instruction, always ask your healthcare professional. [employer] disclaims any warranty or liability for your use of this information.

## 2021-10-14 ENCOUNTER — OFFICE VISIT (OUTPATIENT)
Dept: PSYCHOLOGY | Age: 66
End: 2021-10-14
Payer: MEDICARE

## 2021-10-14 DIAGNOSIS — Z13.220 SCREENING FOR LIPID DISORDERS: ICD-10-CM

## 2021-10-14 DIAGNOSIS — R82.994 HYPERCALCIURIA: ICD-10-CM

## 2021-10-14 DIAGNOSIS — E55.9 VITAMIN D DEFICIENCY: ICD-10-CM

## 2021-10-14 DIAGNOSIS — F41.9 ANXIETY: Primary | ICD-10-CM

## 2021-10-14 LAB
A/G RATIO: 2.3 (ref 1.1–2.2)
ALBUMIN SERPL-MCNC: 4.5 G/DL (ref 3.4–5)
ALP BLD-CCNC: 51 U/L (ref 40–129)
ALT SERPL-CCNC: 13 U/L (ref 10–40)
ANION GAP SERPL CALCULATED.3IONS-SCNC: 13 MMOL/L (ref 3–16)
AST SERPL-CCNC: 20 U/L (ref 15–37)
BILIRUB SERPL-MCNC: 0.7 MG/DL (ref 0–1)
BUN BLDV-MCNC: 14 MG/DL (ref 7–20)
CALCIUM SERPL-MCNC: 9.5 MG/DL (ref 8.3–10.6)
CHLORIDE BLD-SCNC: 100 MMOL/L (ref 99–110)
CHOLESTEROL, TOTAL: 229 MG/DL (ref 0–199)
CO2: 25 MMOL/L (ref 21–32)
CREAT SERPL-MCNC: 0.8 MG/DL (ref 0.6–1.2)
GFR AFRICAN AMERICAN: >60
GFR NON-AFRICAN AMERICAN: >60
GLOBULIN: 2 G/DL
GLUCOSE BLD-MCNC: 100 MG/DL (ref 70–99)
HDLC SERPL-MCNC: 82 MG/DL (ref 40–60)
LDL CHOLESTEROL CALCULATED: 130 MG/DL
POTASSIUM SERPL-SCNC: 4.6 MMOL/L (ref 3.5–5.1)
SODIUM BLD-SCNC: 138 MMOL/L (ref 136–145)
TOTAL PROTEIN: 6.5 G/DL (ref 6.4–8.2)
TRIGL SERPL-MCNC: 87 MG/DL (ref 0–150)
VITAMIN D 25-HYDROXY: 43.5 NG/ML
VLDLC SERPL CALC-MCNC: 17 MG/DL

## 2021-10-14 PROCEDURE — 90791 PSYCH DIAGNOSTIC EVALUATION: CPT | Performed by: SOCIAL WORKER

## 2021-10-14 PROCEDURE — 36415 COLL VENOUS BLD VENIPUNCTURE: CPT | Performed by: INTERNAL MEDICINE

## 2021-10-14 ASSESSMENT — ANXIETY QUESTIONNAIRES
4. TROUBLE RELAXING: 0
6. BECOMING EASILY ANNOYED OR IRRITABLE: 0
GAD7 TOTAL SCORE: 4
7. FEELING AFRAID AS IF SOMETHING AWFUL MIGHT HAPPEN: 1
1. FEELING NERVOUS, ANXIOUS, OR ON EDGE: 1
2. NOT BEING ABLE TO STOP OR CONTROL WORRYING: 1
IF YOU CHECKED OFF ANY PROBLEMS ON THIS QUESTIONNAIRE, HOW DIFFICULT HAVE THESE PROBLEMS MADE IT FOR YOU TO DO YOUR WORK, TAKE CARE OF THINGS AT HOME, OR GET ALONG WITH OTHER PEOPLE: NOT DIFFICULT AT ALL
3. WORRYING TOO MUCH ABOUT DIFFERENT THINGS: 1
5. BEING SO RESTLESS THAT IT IS HARD TO SIT STILL: 0

## 2021-10-14 ASSESSMENT — PATIENT HEALTH QUESTIONNAIRE - PHQ9
6. FEELING BAD ABOUT YOURSELF - OR THAT YOU ARE A FAILURE OR HAVE LET YOURSELF OR YOUR FAMILY DOWN: 1
8. MOVING OR SPEAKING SO SLOWLY THAT OTHER PEOPLE COULD HAVE NOTICED. OR THE OPPOSITE, BEING SO FIGETY OR RESTLESS THAT YOU HAVE BEEN MOVING AROUND A LOT MORE THAN USUAL: 0
SUM OF ALL RESPONSES TO PHQ QUESTIONS 1-9: 2
2. FEELING DOWN, DEPRESSED OR HOPELESS: 0
4. FEELING TIRED OR HAVING LITTLE ENERGY: 0
7. TROUBLE CONCENTRATING ON THINGS, SUCH AS READING THE NEWSPAPER OR WATCHING TELEVISION: 0
3. TROUBLE FALLING OR STAYING ASLEEP: 0
SUM OF ALL RESPONSES TO PHQ QUESTIONS 1-9: 2
SUM OF ALL RESPONSES TO PHQ QUESTIONS 1-9: 2
5. POOR APPETITE OR OVEREATING: 1
SUM OF ALL RESPONSES TO PHQ9 QUESTIONS 1 & 2: 0
10. IF YOU CHECKED OFF ANY PROBLEMS, HOW DIFFICULT HAVE THESE PROBLEMS MADE IT FOR YOU TO DO YOUR WORK, TAKE CARE OF THINGS AT HOME, OR GET ALONG WITH OTHER PEOPLE: 0
9. THOUGHTS THAT YOU WOULD BE BETTER OFF DEAD, OR OF HURTING YOURSELF: 0
1. LITTLE INTEREST OR PLEASURE IN DOING THINGS: 0

## 2021-10-14 NOTE — PROGRESS NOTES
Behavioral Health Consultation  Elyssa Cheung, [address]  10/14/2021   10:12 AM EDT      Time spent with Patient: 45 minutes  This is patient's first [facility] appointment. Reason for Consult:    Chief Complaint   Patient presents with    Anxiety    Stress     Referring Provider: Fredrick Nunez DO  [address]    Patient provided informed consent for the behavioral health program. Discussed with patient model of service to include the limits of confidentiality (i.e. abuse reporting, suicide intervention, etc.) and short-term intervention focused approach. Pt indicated understanding. Feedback given to PCP. S:  Patient presents with concerns about stress/anxiety. Pt reports that she carries a lot of responsibility for others. Patient reports symptoms of anxiety, including excessive worry, racing thoughts, insomnia and rumination, feeling that something bad will happen. Recent stressors include: moving, sister was diagnosed with cancer, COVID, alf, MIL diagnosed with cancer last year and Pt was caretaker; MIL  in 2021, new grandchild on the way. Symptoms have been present since childhood, increase in 2 years. Patient finds relief from talking with others, has had counseling in the past, prayer, exercise. Goals for treatment include symptom improvement. Patient lives with . 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 Daily caffeine use 2 cups/day. Denies cigarette use, occasional alcohol use, denies illegal drug use. There is regular exercise, walking, strength-training. Patient describes difficulty sleeping at times; when worried, will not sleep through the night sleep pattern. Patient describes strong social support. Patient identifies with [address] [place] [address]. COVID vaccination status: Yes. Per Dr. Astudillo Cure note on 10/12/21:  Reports recent issues with stress/anxiety.  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 Recent cancelled vacation that she was looking forward to. New grandchild unexpectedly expected! Excited but new stress of helping to care for him in her snf. Latrell 7 - 4  No depression/si    O:  MSE:    Appearance: good hygiene   Attitude: cooperative and friendly  Consciousness: alert  Orientation: oriented to person, place, time, general circumstance  Memory    recent and remote memory intact   Attention/Concentration    intact  Psychomotor Activity:normal  Eye Contact: normal  Speech: normal rate and volume, well-articulated  Mood    Anxious  Affect    anxiety  Perception: within normal limits  Thought Content: within normal limits  Thought Process: logical, coherent and goal-directed  Associations    logical connections  Insight: good  Judgment    Intact  Appetite normal  Sleep disturbance Yes  Fatigue No  Loss of pleasure No  Impulsive behavior No  Morbid Ideation: no   Suicide Assessment: no suicidal ideation, plan, or intent  Homicidal Ideation: no      History:    Medications:   Current Outpatient Medications   Medication Sig Dispense Refill    diazePAM (VALIUM) 2 MG tablet Take 1 tablet by mouth nightly as needed for Anxiety or Sleep for up to 5 days. 5 tablet 1    sertraline (ZOLOFT) 25 MG tablet Take 1 tablet by mouth daily 30 tablet 1    hydroCHLOROthiazide (MICROZIDE) 12.5 MG capsule TAKE ONE CAPSULE BY MOUTH EVERY MORNING 90 capsule 3    denosumab (PROLIA) 60 MG/ML SOSY SC injection Inject 1 mL into the skin once for 1 dose 1 mL 0    Estradiol (VAGIFEM) 10 MCG TABS vaginal tablet Place 1 tablet vaginally Twice a Week       Multiple Vitamins-Minerals (THERAPEUTIC MULTIVITAMIN-MINERALS) tablet Take 1 tablet by mouth daily. Vit D 1000 units       No current facility-administered medications for this visit.      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[address], , 2 children, was in [place], likes to walk     Social Determinants of Health     Financial Resource Strain:     Difficulty of Paying Living Expenses:    Food Insecurity:     Worried About [address] in the Last Year:     [address] in the Last Year:    Transportation Needs:     Lack of Transportation (Medical):  Lack of Transportation (Non-Medical):    Physical Activity:     Days of Exercise per Week:     Minutes of Exercise per Session:    Stress:     Feeling of Stress :    Social Connections:     Frequency of Communication with Friends and Family:     Frequency of Social Gatherings with Friends and Family:     Attends [employer] Services:     Active Member of Clubs or Organizations:     Attends Club or Organization Meetings:     Marital Status:    Intimate Partner Violence:     Fear of Current or Ex-Partner:     Emotionally Abused:     Physically Abused:     Sexually Abused:      TOBACCO:   reports that she has never smoked. She has never used smokeless tobacco.  ETOH:   reports current alcohol use. Family History:   Family History   Problem Relation Age of Onset    High Blood Pressure Other          A:  Patient endorses stable mood. Denies SI/HI, with good insight and motivation.      PHQ Scores 10/14/2021 10/12/2021 10/5/2020 5/21/2019 11/27/2018 11/1/2018   PHQ2 Score 0 0 0 0 0 0   PHQ9 Score 2 0 0 0 0 0     Interpretation of Total Score Depression Severity: 1-4 = Minimal depression, 5-9 = Mild depression, 10-14 =

## 2021-10-27 NOTE — PROGRESS NOTES
Behavioral Health Consultation- Follow UP  Betty Moody, [address]  10/28/2021   11:01 AM      Time spent with Patient: 47 minutes  This is patient's second  [facility] appointment. Reason for Consult:    Chief Complaint   Patient presents with    Anxiety     Referring Provider: Star Lozoya DO  [address]    Patient provided informed consent for the behavioral health program. Discussed with patient model of service to include the limits of confidentiality (i.e. abuse reporting, suicide intervention, etc.) and short-term intervention focused approach. Pt indicated understanding. Feedback given to PCP. S:  Last appointment 10/14/21    Pt reports feeling \"really good\" after last visit with [facility]. Pt states that she often feels guilty for prioritizing her own needs. Pt has travel anxiety and feels that something bad will happen. Pt has opportunity to travel in February. Discussed pros/cons of traveling. Pt continues to have anxiety, and states that she feels motivated to identify healthy coping techniques to address sxs. Pt has started yoga, has been doing deep breathing, staring playing pickle ball.     O:  MSE:    Appearance: good hygiene   Attitude: cooperative and friendly  Consciousness: alert  Orientation: oriented to person, place, time, general circumstance  Memory    recent and remote memory intact   Attention/Concentration    intact  Psychomotor Activity:normal  Eye Contact: normal  Speech: normal rate and volume, well-articulated  Mood    Anxious  Affect    normal affect  Perception: within normal limits  Thought Content: within normal limits  Thought Process: logical, coherent and goal-directed  Associations    logical connections  Insight: good  Judgment    Intact  Appetite normal  Sleep disturbance No  Fatigue No  Loss of pleasure No  Impulsive behavior No  Morbid Ideation: no   Suicide Assessment: no suicidal ideation, plan, or intent  Homicidal Ideation: no    History:    Medications:   Current Outpatient Medications   Medication Sig Dispense Refill    sertraline (ZOLOFT) 25 MG tablet Take 1 tablet by mouth daily 30 tablet 1    hydroCHLOROthiazide (MICROZIDE) 12.5 MG capsule TAKE ONE CAPSULE BY MOUTH EVERY MORNING 90 capsule 3    denosumab (PROLIA) 60 MG/ML SOSY SC injection Inject 1 mL into the skin once for 1 dose 1 mL 0    Estradiol (VAGIFEM) 10 MCG TABS vaginal tablet Place 1 tablet vaginally Twice a Week       Multiple Vitamins-Minerals (THERAPEUTIC MULTIVITAMIN-MINERALS) tablet Take 1 tablet by mouth daily. Vit D 1000 units       No current facility-administered medications for this visit. 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[employer], , 2 children, [place], likes to walk     Social Determinants of Health     Financial Resource Strain:     Difficulty of Paying Living Expenses:    Food Insecurity:     Worried About [address] in the Last Year:     [address] in the Last Year:    Transportation Needs:     Lack of Transportation (Medical):      Lack of Transportation (Non-Medical):    Physical Activity:     Days of Exercise per Week:     Minutes of Exercise per Session:    Stress:     Feeling of Stress :    Social Connections:     Frequency of Communication with Friends and Family:     Frequency of Social Gatherings with Friends and Family:     Attends Taoist Services:     Active Member of Clubs or Organizations:     Attends Club or Organization Meetings:     Marital Status:    Intimate Partner Violence:     Fear of Current or Ex-Partner:     Emotionally Abused:     Physically Abused:     Sexually Abused:      TOBACCO:   reports that she has never smoked. She has never used smokeless tobacco.  ETOH:   reports current alcohol use. Family History:   Family History   Problem Relation Age of Onset    High Blood Pressure Other          A:  Patient endorses stable mood. Denies SI/HI, with good insight and motivation. PHQ Scores 10/28/2021 10/14/2021 10/12/2021 10/5/2020 5/21/2019 11/27/2018 11/1/2018   PHQ2 Score 0 0 0 0 0 0 0   PHQ9 Score 0 2 0 0 0 0 0     Interpretation of Total Score Depression Severity: 1-4 = Minimal depression, 5-9 = Mild depression, 10-14 = Moderate depression, 15-19 = Moderately severe depression, 20-27 = Severe depression     JESSICA 7 SCORE 10/28/2021 10/14/2021 12/4/2018   JESSICA-7 Total Score 1 4 -   JESSICA-7 Total Score - - 4     Interpretation of JESSICA-7 score: 5-9 = mild anxiety, 10-14 = moderate anxiety, 15+ = severe anxiety. Recommend referral to behavioral health for scores 10 or greater. Diagnosis:    1.  Anxiety          Past Diagnosis:        Diagnosis Date    Arthritis     [employer] Foot 1956    left    Osteoporosis     Plantar fasciitis          Plan:  Patient interventions:  Provided handout on  mindfulness and constructive worry  Trained in strategies for increasing balanced thinking  Provided Psychoeducation re: mindfulness, constructive worry  Cognitive strategies to target catastrophic thinking including dispute the evidence and reframing thoughts  Promoted hope using strengths-based approach    Patient Behavioral Change Plan:   See Patient Instructions

## 2021-10-28 ENCOUNTER — OFFICE VISIT (OUTPATIENT)
Dept: PSYCHOLOGY | Age: 66
End: 2021-10-28
Payer: MEDICARE

## 2021-10-28 DIAGNOSIS — F41.9 ANXIETY: Primary | ICD-10-CM

## 2021-10-28 PROCEDURE — 90834 PSYTX W PT 45 MINUTES: CPT | Performed by: SOCIAL WORKER

## 2021-10-28 ASSESSMENT — PATIENT HEALTH QUESTIONNAIRE - PHQ9
SUM OF ALL RESPONSES TO PHQ QUESTIONS 1-9: 0
7. TROUBLE CONCENTRATING ON THINGS, SUCH AS READING THE NEWSPAPER OR WATCHING TELEVISION: 0
9. THOUGHTS THAT YOU WOULD BE BETTER OFF DEAD, OR OF HURTING YOURSELF: 0
SUM OF ALL RESPONSES TO PHQ QUESTIONS 1-9: 0
8. MOVING OR SPEAKING SO SLOWLY THAT OTHER PEOPLE COULD HAVE NOTICED. OR THE OPPOSITE, BEING SO FIGETY OR RESTLESS THAT YOU HAVE BEEN MOVING AROUND A LOT MORE THAN USUAL: 0
2. FEELING DOWN, DEPRESSED OR HOPELESS: 0
4. FEELING TIRED OR HAVING LITTLE ENERGY: 0
1. LITTLE INTEREST OR PLEASURE IN DOING THINGS: 0
SUM OF ALL RESPONSES TO PHQ QUESTIONS 1-9: 0
6. FEELING BAD ABOUT YOURSELF - OR THAT YOU ARE A FAILURE OR HAVE LET YOURSELF OR YOUR FAMILY DOWN: 0
5. POOR APPETITE OR OVEREATING: 0
SUM OF ALL RESPONSES TO PHQ9 QUESTIONS 1 & 2: 0
3. TROUBLE FALLING OR STAYING ASLEEP: 0

## 2021-10-28 ASSESSMENT — ANXIETY QUESTIONNAIRES
IF YOU CHECKED OFF ANY PROBLEMS ON THIS QUESTIONNAIRE, HOW DIFFICULT HAVE THESE PROBLEMS MADE IT FOR YOU TO DO YOUR WORK, TAKE CARE OF THINGS AT HOME, OR GET ALONG WITH OTHER PEOPLE: NOT DIFFICULT AT ALL
4. TROUBLE RELAXING: 0
1. FEELING NERVOUS, ANXIOUS, OR ON EDGE: 0
7. FEELING AFRAID AS IF SOMETHING AWFUL MIGHT HAPPEN: 1
3. WORRYING TOO MUCH ABOUT DIFFERENT THINGS: 0
GAD7 TOTAL SCORE: 1
2. NOT BEING ABLE TO STOP OR CONTROL WORRYING: 0
5. BEING SO RESTLESS THAT IT IS HARD TO SIT STILL: 0
6. BECOMING EASILY ANNOYED OR IRRITABLE: 0

## 2021-10-28 NOTE — PATIENT INSTRUCTIONS
1. Engage in constructive worry exercise once/week. Use distraction techniques if worry comes up outside of designated worry time. 2. Review handout on mindfulness. 3. Continue using deep breathing. 4. Incorporate self-care/relaxation into routine 3x/week. Constructive Worry Instructions  When we have challenges, we tend to use our problem-solving skills to make our lives better and to relieve ourselves of anxiety. It is not surprising that some of us may use our problem-solving skills at the wrong time and place, namely bedtime. We may think about a problem, trying to solve it, but unfortunately this will oftentimes keep us awake. Constructive worry is a method for managing the tendency to worry during that quiet time when sleep is supposed to be taking over. Do this exercise early in the evening (at least 2 hours before bed). It should take only about 15 minutes to complete. Here is how it is done:  1. Write down the problem(s) facing you that has the greatest chance of keeping you awake a bedtime, and list them in the Concerns column of the [address]. 2. Then, think of the next step that might help fix it. Write it down in the Solutions column. This need not be the final solution to the problem, since most problems have to be solved by taking steps anyhow, and you will be doing this again tomorrow night and the night after until you finally get to the best solution.  If you know how to fix the problem completely, then write that down.  If you decide that this is not really a big problem, and you will just deal with it when the time comes, then write that down.  If you decide that you simply do not know what to do about it, and need to ask someone to help you, write that down.    If you decide that it is a problem, but there seems to be no good solution at all, and that you will just have to live with it, write that down, with a note to yourself that maybe sometime soon you or someone you speak with will give you a clue that will lead you to a solution. 3. Repeat this for any other concerns you have. 4. Fold the Constructive Worry Worksheet in half and place it on the nightstand next to your bed and forget about it until bedtime. 5. At bedtime, if you begin to worry actually tell yourself that you have dealt with your problems already in the best way you know how, and when you were at your problem-solving best.  Remind yourself that you will be working on them again tomorrow evening and that nothing you can do while you are so tired can help you any more than what you have already done; more effort will only make the matters worst.    MINDFULNESS    Mindfulness means paying attention in a particular way: on purpose, in the present moment, and non-judgmentally. Kinza Ford    \"Mindfulness is the basic human ability to be fully present, aware of where we are and what were doing, and not overly reactive or overwhelmed by whats going on around us. \"   -Mindful Moro    Paying attention on purpose  Mindfulness involves paying attention on purpose. Mindfulness involves a conscious direction of our awareness. This can mean purposefully directing our attention to the breath, or a particular emotion, or an activity as simple as eating. Doing so allows us to actively shape the mind. Paying attention in the present moment  Left to itself, the mind wanders through all kinds of thoughts -- including thoughts expressing anger, craving, depression, self-pity, and anxiety. As we indulge in these kinds of thoughts, we reinforce those emotions and cause ourselves to suffer. These thoughts usually center around the past or future. But the past no longer exists. The future is just a fantasy until it happens. The one moment we actually can experience -- the present moment -- is the one we seem most to avoid.  By purposefully directing our awareness away

## 2021-11-03 ENCOUNTER — NURSE ONLY (OUTPATIENT)
Dept: ENDOCRINOLOGY | Age: 66
End: 2021-11-03
Payer: MEDICARE

## 2021-11-03 DIAGNOSIS — M81.0 OSTEOPOROSIS, POSTMENOPAUSAL: Chronic | ICD-10-CM

## 2021-11-03 PROCEDURE — 96372 THER/PROPH/DIAG INJ SC/IM: CPT | Performed by: INTERNAL MEDICINE

## 2021-11-18 ENCOUNTER — OFFICE VISIT (OUTPATIENT)
Dept: PSYCHOLOGY | Age: 66
End: 2021-11-18
Payer: MEDICARE

## 2021-11-18 DIAGNOSIS — F41.9 ANXIETY: Primary | ICD-10-CM

## 2021-11-18 PROCEDURE — 90834 PSYTX W PT 45 MINUTES: CPT | Performed by: SOCIAL WORKER

## 2021-11-18 ASSESSMENT — PATIENT HEALTH QUESTIONNAIRE - PHQ9
1. LITTLE INTEREST OR PLEASURE IN DOING THINGS: 0
SUM OF ALL RESPONSES TO PHQ9 QUESTIONS 1 & 2: 0
3. TROUBLE FALLING OR STAYING ASLEEP: 0
4. FEELING TIRED OR HAVING LITTLE ENERGY: 0
7. TROUBLE CONCENTRATING ON THINGS, SUCH AS READING THE NEWSPAPER OR WATCHING TELEVISION: 0
SUM OF ALL RESPONSES TO PHQ QUESTIONS 1-9: 0
SUM OF ALL RESPONSES TO PHQ QUESTIONS 1-9: 0
2. FEELING DOWN, DEPRESSED OR HOPELESS: 0
SUM OF ALL RESPONSES TO PHQ QUESTIONS 1-9: 0
5. POOR APPETITE OR OVEREATING: 0
8. MOVING OR SPEAKING SO SLOWLY THAT OTHER PEOPLE COULD HAVE NOTICED. OR THE OPPOSITE, BEING SO FIGETY OR RESTLESS THAT YOU HAVE BEEN MOVING AROUND A LOT MORE THAN USUAL: 0
9. THOUGHTS THAT YOU WOULD BE BETTER OFF DEAD, OR OF HURTING YOURSELF: 0
6. FEELING BAD ABOUT YOURSELF - OR THAT YOU ARE A FAILURE OR HAVE LET YOURSELF OR YOUR FAMILY DOWN: 0

## 2021-11-18 ASSESSMENT — ANXIETY QUESTIONNAIRES
6. BECOMING EASILY ANNOYED OR IRRITABLE: 0
GAD7 TOTAL SCORE: 1
2. NOT BEING ABLE TO STOP OR CONTROL WORRYING: 0
3. WORRYING TOO MUCH ABOUT DIFFERENT THINGS: 1
4. TROUBLE RELAXING: 0
1. FEELING NERVOUS, ANXIOUS, OR ON EDGE: 0
5. BEING SO RESTLESS THAT IT IS HARD TO SIT STILL: 0
7. FEELING AFRAID AS IF SOMETHING AWFUL MIGHT HAPPEN: 0

## 2021-11-18 NOTE — PROGRESS NOTES
Behavioral Health Consultation- Follow UP  Otis Lara, [address]  11/18/2021   11:20 AM      Time spent with Patient: 38 minutes  This is patient's third  [facility] appointment. Reason for Consult:    Chief Complaint   Patient presents with    Anxiety     Referring Provider: Mauricia Apley, DO  [address]    Patient provided informed consent for the behavioral health program. Discussed with patient model of service to include the limits of confidentiality (i.e. abuse reporting, suicide intervention, etc.) and short-term intervention focused approach. Pt indicated understanding. Feedback given to PCP. S:  Last appointment 10/28/21    Pt reports feeling much better. Pt is using skills during [facility] visits. Pt discussed effects that anxiety has made on decision making. Pt discussed catalyst for seeking behavioral health services. Pt discussed possible onset of anxiety during childhood. Discussed relationships and their impact on anxiety.      O:  MSE:    Appearance: good hygiene   Attitude: cooperative and friendly  Consciousness: alert  Orientation: oriented to person, place, time, general circumstance  Memory    recent and remote memory intact   Attention/Concentration    intact  Psychomotor Activity:normal  Eye Contact: normal  Speech: normal rate and volume, well-articulated  Mood    Anxious  Affect    normal affect  Perception: within normal limits  Thought Content: within normal limits  Thought Process: logical, coherent and goal-directed  Associations    logical connections  Insight: good  Judgment    Intact  Appetite normal  Sleep disturbance No  Fatigue No  Loss of pleasure No  Impulsive behavior No  Morbid Ideation: no   Suicide Assessment: no suicidal ideation, plan, or intent  Homicidal Ideation: no    History:    Medications:   Current Outpatient Medications   Medication Sig Dispense Refill    sertraline (ZOLOFT) 25 MG tablet Take 1 tablet by mouth daily 30 tablet 1  hydroCHLOROthiazide (MICROZIDE) 12.5 MG capsule TAKE ONE CAPSULE BY MOUTH EVERY MORNING 90 capsule 3    denosumab (PROLIA) 60 MG/ML SOSY SC injection Inject 1 mL into the skin once for 1 dose 1 mL 0    Estradiol (VAGIFEM) 10 MCG TABS vaginal tablet Place 1 tablet vaginally Twice a Week       Multiple Vitamins-Minerals (THERAPEUTIC MULTIVITAMIN-MINERALS) tablet Take 1 tablet by mouth daily. Vit D 1000 units       No current facility-administered medications for this visit. 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[employer], , 2 children, was in [place], likes to walk     Social Determinants of Health     Financial Resource Strain:     Difficulty of Paying Living Expenses: Not on file   Food Insecurity:     Worried About [address] in the Last Year: Not on file    [address] in the Last Year: Not on file   Transportation Needs:     Lack of Transportation (Medical): Not on file    Lack of Transportation (Non-Medical):  Not on file   Physical Activity:     Days of Exercise per Week: Not on file    Minutes of Exercise per Session: Not on file   Stress:     Feeling of Stress : Not on file   Social Connections:     Frequency of Communication with Friends and Family: Not on file    Frequency of Social Gatherings with Friends and Family: Not on file    Attends Sikh Services: Not on file   [employer] of Clubs or Organizations: Not on file    Attends Club or Organization Meetings: Not on file    Marital Status: Not on file   Intimate Partner Violence:     Fear of Current or Ex-Partner: Not on file    Emotionally Abused: Not on file    Physically Abused: Not on file    Sexually Abused: Not on file   Housing Stability:     Unable to Pay for Housing in the Last Year: Not on file    Number of [place] in the Last Year: Not on file    Unstable Housing in the Last Year: Not on file     TOBACCO:   reports that she has never smoked. She has never used smokeless tobacco.  ETOH:   reports current alcohol use. Family History:   Family History   Problem Relation Age of Onset    High Blood Pressure Other          A:  Patient endorses stable mood. Denies SI/HI, with good insight and motivation. PHQ Scores 11/18/2021 10/28/2021 10/14/2021 10/12/2021 10/5/2020 5/21/2019 11/27/2018   PHQ2 Score 0 0 0 0 0 0 0   PHQ9 Score 0 0 2 0 0 0 0     Interpretation of Total Score Depression Severity: 1-4 = Minimal depression, 5-9 = Mild depression, 10-14 = Moderate depression, 15-19 = Moderately severe depression, 20-27 = Severe depression     JESSICA 7 SCORE 11/18/2021 10/28/2021 10/14/2021 12/4/2018   JESSICA-7 Total Score 1 1 4 -   JESSICA-7 Total Score - - - 4     Interpretation of JESSICA-7 score: 5-9 = mild anxiety, 10-14 = moderate anxiety, 15+ = severe anxiety. Recommend referral to behavioral health for scores 10 or greater. Diagnosis:    1.  Anxiety          Past Diagnosis:        Diagnosis Date    Arthritis     [employer] 1956    left    Osteoporosis     Plantar fasciitis          Plan:  Patient interventions:  Provided handout on  radical acceptance and assertive communication  Emphasized self-care as important for managing overall health  Trained in strategies for increasing balanced thinking  Trained in improving communication skills  Provided Psychoeducation re: anxiety, radical acceptance  CBT to target anxiety  Promoted hope using strengths-based approach    Patient Behavioral Change Plan:   See Patient Instructions

## 2021-11-18 NOTE — PATIENT INSTRUCTIONS
1. Review handout on Radical Acceptance. 2. Continue to communicate with your . 3. Continue using deep breathing. 4. Incorporate self-care/relaxation into routine 3x/week. One of the four options you have for any problem is \"radical acceptance\" (Edson Sheets). Radical acceptance is about accepting life on lifes terms and not resisting what you cannot or choose not to change. Radical acceptance is about saying yes to life, just as it is. Imagine that you talk with an  about leasing an apartment in a [employer] complex that is completely full. He agrees to call you when the two-bedroom apartment is available. You wait for months, then stop by to check with him. When you arrive, he is signing a lease agreement with a couple for a two-bedroom unit. When you confront him, he shrugs. That shouldnt happen. It isnt fair. And it did happen. The pain is the loss of an apartment that you really wanted. You may feel sad and hurt. Suffering is what you do with that pain and the interpretation you put on the pain. Suffering is optional; pain is not. Its difficult to accept what you dont want to be true. And its more difficult to not accept. Not accepting pain brings suffering. Refusing to [address] often say, I cant stand this, This isnt fair, This cant be true, and It shouldnt be this way.  Its almost as if we think refusing to accept the truth will keep it from being true, or that accepting means agreeing. Accepting doesnt mean agreeing. Its exhausting to fight reality, and it doesnt work. Refusing to accept that you were fired for something you didnt do, that your friend cheated you, or that you werent accepted into the college you wanted to attend doesnt change the situation, and it adds to the pain you experience. Accepting reality is difficult when life is painful. No one wants to experience pain, disappointment, sadness, or loss.  But 2021       Tone Mahmood MD  [address] [place] [address]  Via In Basket      Patient: Marlene Rodriguez   YOB: 1948   Date of Visit: 2021       Dear Dr. Mahmood:    Thank you for referring Marlene Rodriguez to me for evaluation. Below are my notes for this visit with her.    If you have questions, please do not hesitate to call me. I look forward to following your patient along with you.      Sincerely,        Rena Huff MD        CC: No Recipients  Rena Huff MD  2021  9:03 AM  Signed  [facility]  [address], [place] [address]  P [phone]  F [phone]        PROGRESS NOTE - CARDIOLOGY    PATIENT:  Marlene Rodriguez   : 1948    CHIEF COMPLAINT    Referring Physician: Tone Mahmood MD     HISTORY OF PRESENT ILLNESS  Ms. Rodriguez is a 72 year old female with moderate to severe aortic regurgitation, aortic stenosis, ascending thoracic aneurysm, hypertension, diabetes and hyperlipidemia.  She has endometrial carcinoma status post resection and now on chemotherapy as of 2021.    Presented to [facility] on 20 for congestive heart failure and COPD exacerbation. Diuretics started placed on steroids and antibiotics. CT revealed possible evidence of interstitial lung disease.  At that time workup was started for possible aortic valve replacement. Left heart cath revealed normal coronaries, right heart with compensated intracardiac pressures.    After discharge was seen in valve clinic 20 by Dr. Almonte and at that time determined not good candidate for TAVR and recommended SAVR however in the interim has been diagnosed with endometrial malignancy and needed surgery - and thus underwent TAVR with #25 Isabella Valve on 2020 by Dr. Dempsey.  Had new LBBB after TAVR.   Since TAVR heart rate elevated and metoprolol XL started. BP low so nifedipine stopped.     Endometrial carcinoma on chemotherapy per dr Brown.   Carboplatin and taxol.      Last seen by Cheyenne De La Cruz NP, in 10/2020, was doing better since TAVR. Presenting today for a cardiovascular follow up.       Overall is doing well her shortness of breath significantly improved since her TAVR.  Had a1 month post TAVR follow-up Echo (10/1/20) as per the valve clinic ejection fraction is 40-45%.    BP at home at times 140's otherwise in the 120s systolic.  No chest pain, shortness of breath, orthopnea or PND. No syncope.   The patient is moderately active without any significant symptoms at this time.  Patient is compliant with medical therapy.      MEDICATIONS  Prior to Admission medications    Medication Sig Start Date End Date Taking? Authorizing Provider   dexamethasone (DECADRON) 4 MG tablet Take 4 mg by mouth 3 times daily (with meals). During chemotherapy (every 3 weeks)  3 more sessions as of 01/27/21   Yes Outside Provider   loratadine (CLARITIN) 10 MG tablet Take 10 mg by mouth daily.   Yes Outside Provider   ondansetron (ZOFRAN ODT) 4 MG disintegrating tablet DISSOLVE ONE TABLET IN MOUTH THREE TIMES DAILY 12/30/20  Yes Outside Provider   Vitamin D, Ergocalciferol, 1.25 mg (50,000 units) capsule Take 1 capsule by mouth 1 day a week. 1/4/21  Yes Tone Mahmood MD   ondansetron (ZOFRAN ODT) 8 MG disintegrating tablet 1 tablet every 8 hours as needed for Nausea. 12/9/20  Yes Faye Brown MD   clopidogrel (PLAVIX) 75 MG tablet Take 1 tablet by mouth daily. 10/26/20  Yes Rena Huff MD   losartan (COZAAR) 25 MG tablet Take 1 tablet by mouth daily. 10/26/20  Yes Rena Huff MD   metoPROLOL succinate (TOPROL-XL) 25 MG 24 hr tablet Take 1 tablet in the morning and 2 tablets in the evening. 10/23/20  Yes Rena Huff MD   allopurinol (ZYLOPRIM) 300 MG tablet Take 150 mg by mouth daily.    Yes Outside Provider   furosemide (LASIX) 20 MG tablet TAKE 1 TABLET DAILY AND TAKE AN EXTRA DOSE IN THE EVENING AS NEEDED FOR EDEMA, SHORTNESS OF BREATH 8/17/20  those experiences are a part of life. When you attempt to avoid or resist those emotions, you add suffering to your pain. You may build the emotion bigger with your thoughts or create more misery by attempting to avoid the painful emotions. You can stop suffering by practicing acceptance. Life is full of experiences, some that you enjoy and others you dislike. When you push away or attempt to avoid feelings of sadness and pain, you also diminish your ability to feel cassy. Avoidance of emotions often leads to depression and anxiety. Avoidance can also lead to destructive behaviors, such as gambling, drinking too much, overspending, eating too little or too much, and overworking. These behaviors may help avoid pain in the short run, but they only make the situation worse in the long run. Acceptance means that you can turn your resistant ruminating into accepting thoughts like, Im in this situation. I dont approve of it. I dont think its OK, but it is what it is, and I cant change that it happened.     Imagine that you are late for an important job interview. Traffic is especially congested, and you are stopped at red light after red light. Raging at the traffic lights or the drivers in front of you will not help you get to your destination sooner and will only add to your upset. Accepting the situation and doing the best you can will be less emotionally painful, and likely more effective. With acceptance you will arrive at your interview less distressed and perhaps better able to manage the situation.                                                        Assertive Communication                                                        Assertiveness Is Simple but Hard    NonAssertive           Assertive                      Aggressive   (Passive)            (Tactful)             (Rude)       H onest                            X  H onest                     X H onest   X A ppropriate                     X  A  Yes Rena Huff MD   SOFTCLIX LANCETS Misc TEST BLOOD SUGAR TWICE DAILY 8/15/20  Yes Tone Mahmood MD   ACCU-CHEK ROSALIO PLUS test strip TEST BLOOD GLUCOSE TWO TIMES A DAY 8/15/20  Yes Tone Mahmood MD   aspirin (ASPIRIN 81) 81 MG EC tablet Take 1 tablet by mouth daily. 6/18/20  Yes Rena Huff MD   ascorbic acid (VITAMIN C) 500 MG tablet Take 1,000 mg by mouth daily.  2/9/20  Yes Provider Not In System   Blood Glucose Calibration (ACCU-CHEK ROSALIO) Solution Use as directed   Yes Outside Provider   ALPRAZolam (XANAX PO) Take by mouth as needed.    Outside Provider     The patient's current medications were reviewed.    ALLERGIES  ALLERGIES:   Allergen Reactions   • Pravastatin Sodium MYALGIA   • Simvastatin MYALGIA        HISTORIES  Past Medical History:   Diagnosis Date   • Anxiety    • Aortic aneurysm (CMS/HCC)     due to severe AI, TAVR CTA 4.4 x 4.3 12/20 - referred for SAVR (not TAVR candidate)    • Aortic insufficiency     severe AI - seen by Dr. Almonte (valve clinic)    • Arthritis    • Cardiomyopathy (CMS/HCC) 1/27/2021   • Diastolic heart failure (CMS/HCC)    • Essential (primary) hypertension    • Gout    • Hyperlipidemia LDL goal <70    • Interstitial lung disease (CMS/HCC)    • LBBB (left bundle branch block)     noted on ekg after TAVR 09/2020   • S/p TAVR (transcatheter aortic valve replacement), bioprosthetic     09/01/2020 Isabella edge #25 valve by DR. Dempsey   • Sinus tachycardia     (reflexive due to AR)       Past Surgical History:   Procedure Laterality Date   • Aortic valvuloplasty Right 09/01/2020   • Dilation and curettage  01/16/2020    D&C, hysteroscopy, polypectomy   • Hysterectomy  10/07/2020   • Mediport insertion, single  11/25/2020   • Tubal ligation         FAMILY HISTORY  Family History   Problem Relation Age of Onset   • Other Father         Heat prob   • Myocardial Infarction Father    • Other Sister         kidney issues   • Cancer, Lung Sister    • Other Brother      ppropriate                 A ppropriate   X R espectful                       X  R espectful                   R espectful       D irect                       X  D irect                       X D irect      Assertiveness involves respecting your rights and the rights of others. Important Facts About Assertiveness      Use I or me statements such as When you do ______, I feel _____.     Voice tone, eye contact, and body posture are important parts of assertive communication.  Use a steady and calm voice, stand or sit up straight, look the other person in the eyes without glaring.  Feelings are usually only one word (e.g. angry, anxious, happy, sad, hurt, frustrated, joyful)    Remember, assertiveness doesnt guarantee that you will get what you want or that the other person will understand your concerns or be happy with what you said. It does improve the chances that the other person will understand what you want or how you feel and thus improve your chances of communicating effectively. Four Essential Steps to Assertive Communication     1. Tell the person what you think about their behavior without accusing them. 2. Tell them how you feel when they behave a certain way. 3. Tell them how their behavior affects you and your relationship with them. 4. Tell them what you would prefer them to do instead. XYZ* Formula for Effective Communication     The goal of the XYZ* formula is to express the way you feel (internal world) in response to others behavior (external world) in specific situations. You are the only person who has access to your feelings. Others have no access to your internal world. The only way they will know what you are feeling is if you tell them. Similarly, you only have access to other peoples external world. It is very easy to make a mistake when trying to guess what others are feeling or intending.  I feel X  when you do Y  in situation Z  and I would like *    I     gout/arthritis   • HIV Brother         diet at 37 from       SOCIAL HISTORY  Social History     Tobacco Use   • Smoking status: Never Smoker   • Smokeless tobacco: Never Used   Substance Use Topics   • Alcohol use: Never     Frequency: Never     Binge frequency: Never   • Drug use: Never        REVIEW OF SYSTEMS    Constitutional: Negative for fatigue.   HENT: Negative for congestion and ear discharge.    Eyes: Negative for discharge.   Respiratory: DENIES SOB (09/09/2020)    Cardiovascular: Negative for chest pain.  Gastrointestinal: Negative for abdominal distention, nausea or vomiting.   Endocrine: Negative for polyphagia.   Genitourinary: Negative for hematuria. Vaginal discharge and bleeding (spotting with exertion since October, 2019 intermittently).   Musculoskeletal: Negative for arthralgias.   Skin: Negative for color change.   Allergic/Immunologic: Negative for environmental allergies.   Neurological: Negative for seizures.   Hematological: Does not bruise/bleed easily.   Psychiatric/Behavioral: Negative for behavioral problems.     PHYSICAL EXAM  Visit Vitals  /78 (BP Location: RUE - Right upper extremity)   Pulse (!) 112   Temp 97.5 °F (36.4 °C) (Temporal)   Resp 20   Ht 5' 2\" (1.575 m)   Wt 90.8 kg (200 lb 2.8 oz)   BMI 36.61 kg/m²     Constitutional: Appears morbidly obese.    Head: Normocephalic.   Mouth/Throat: Mucous membranes are normal.   Eyes: Conjunctivae are normal. Pupils are equal, round, and reactive to light.   Neck: Normal range of motion. Neck supple. No JVD present. Carotid bruit is not present.   Cardiovascular: RRR nl S1S2, Grade I/VI DOTTIE  nonpitting bilateral LE edema, no JVD  Pulmonary/Chest: Effort normal and breath sounds normal. No respiratory distress. No wheezes, rhonchi, rales, no tenderness.   Abdominal: Soft. Normal appearance and bowel sounds are normal. There is no tenderness.   Musculoskeletal: Normal range of motion. No joint swelling   Neurological: Grossly  feel angry  when you leave your socks and underwear on the bedroom floor  after work  and I would like you to put them in the hamper. I felt insignificant  when you left me with an empty gas tank  yesterday  and I would like you to leave the car with at least 1/4 tank of gas. I feel angry  when you dont call me  if you are staying late at work  and I would like you to call as soon as you know you will be late. I feel loved  when you kiss me  when you get home  and I would like you to do that everyday. normal. Alert and oriented to person, place, and time.   Skin: Skin is warm, dry and intact. No rash noted. No erythema.   Psychiatric: Normal mood and affect. Behavior is normal.     CARDIAC TESTING/IMAGING  I have reviewed the pertinent imaging study reports. These are the pertinent findings:  · ECG performed and personally reviewed today -   · Labs- 1/20/21: Cr 0.91, K 3.5, Hgb 12.1,    · Lipids -   · 7/30/20: , , HDL 65, LDL 89  · 10/2/19: , , HDL 60, LDL 89  · TTE -   · 10/1/20: LV size is nml. Wall thickness is moderately increased. Mildly increased relative wall thickness. There is  concentric hypertrophy. Systolic function is mildly to moderately reduced. The estimated ejection fraction is 40-45%.  Hypokinesis of the entire myocardium.  Regional wall motion abnormalities: Dyskinesis of the basal  anteroseptal and basal-mid inferoseptal myocardium. Aortic valve: Mild regurgitation. Status post TAVR. Aorta: Dilated asc aorta 4.38cm. Mitral valve: The findings are consistent with moderate stenosis. Mean gradient of 6mmHg at a heart rate of 88bpm.   · 09/02/2020 post op TAVR echo: Post TAVR:  There is a MARGOT Edge #25 transcatheter valve that is well seated in AV position.  The LVOT diameter is 1.8cm. PG 26mmHg. MG 13mmHg. The AV VTI is 49.5cm. The aortic valve area is 1.1cm\S\2. The aortic valve velocity  ratio is 0.4. The peak jet velocity is 2.5m/sec. There is trace aortic regurgitation. There is no perivalular leak.   · 2/7/20:There is normal left ventricular size and systolic function with a estimated ejection fraction of 55-60 %.  There are no segmental wall motion abnormalities.  There is mild concentric left ventricular hypertrophy.  The diastolic function is mildly abnormal.  Heavily calcified aortic valve with moderate stenosis and severe aortic insufficiency. Mildly dilated ascending aorta. Relatively unchanged findings when compared to the echocardiogram done 4/12/2019.  · PFT  -   · 12/11/19- mild restrictive disease, mild diffusion defect.  Findings suggestive of interstitial lung disease.    · TAVR CTA -   · 12/10/20- ascending aorta measures 4.4 x 4.3 cm at level of right PA.  Distal aortic arch and descending aorta normal caliber.  + ground glass opacities throughout lungs- seen by Dr. Brown.  2.8 cm aneurysm right brachiocephalic artery.  Aortic annulus perimeter 75.0, area 434, area derived 23.5.  sinuses: 31.2 x 29.7 x 35.6.  Aortic valve calcification score 6704.   · Carotid US -   · 2/11/19-mild atherosclerotic plaque at right carotid bulb, which primarily affects external carotid origin.  Minimal plaque at left carotid bulb.  Both internal carotid arteries fall into lowest category less than 50% stenosis.  · Cardiac Cath -  · 2/7/20: Left main coronary artery is a very short vessel and bifurcates into the LAD and circumflex.  Angiographically within normal limits. Left anterior descending coronary artery: Moderate caliber vessel, angiographically free of significant disease.  Small diagonal branches free of disease. Left circumflex coronary artery: Moderate caliber codominant vessel, angiographically free of significant disease.  One OM branch free of disease and left PDA free of disease. Right coronary artery is a moderate caliber vessel, codominant, angiographically free of significant disease.  · 24 hour holter monitor - 11/2020: occasional premature atrial and ventricular contractions, one episode of PSVT of 17 beats      ASSESSMENT/PLAN    S/p TAVR with #25 Isabella Edge bioprosthetic valve 09/01/2020   Postop echo no paravalvular leaks. See details above for echo from 10/1/20. On plavix and aspirin  would enroll in phase 2 cardiac rehab but since she is getting chemotherapy she wants to defer this    Nonrheumatic aortic valve stenosis and insufficiency  Now s/p Isabella TAVR on 09/01/2020. Postop echo with no PVL with trace AR.  In 1 month follow-up echo post TAVR valve doing  well.    Thoracic aortic aneurysm without rupture (CMS/HCC)  Ascending ao 4.4 x 4.3 cm -   Blood pressure controlled, john on metoprolol for guideline directed medical therapy which we will continue.    Rheumatic mitral stenosis  Mild MS per TTE 2/20 -repeat echo 1 month post TAVR with Mean gradient of 6mmHg at a heart rate of 88bpm. Needs better HR control.  We are increasing her metoprolol XL to 25 mg in the morning and 50 mg in the evening, need to be cautious with new left bundle branch block after TAVR that does not progress to high AV block   24 hour holter from 11/2020 as above    Left bundle branch block  New after TAVR. nml coronaries prior to TAVR     Acute on chronic diastolic congestive heart failure due to valvular disease (CMS/HCC)  Euvolemic appearing on exam. NYHA class II. Continue loop diuretic.  Due to body habitus, volume status difficult to determine.     Cardiomyopathy  Valvular, normal coronaries  s/p TAVR 09/2020  LVEF 40-45% (echo 10/2020), (EF was 45-50% on Echo 09/2020 pre TAVR) NYHA Class 2 Stage C.  Euvolemic on exam.  On guideline directed medical therapy:   BB: Metoprolol XL 25 mg morning 50 mg in the evening  ACEI/ARB/ARNI: losartan 25 mg once a day  MRA: None  Diuretics: Furosemide 20 mg once a day  Hydralazine/Nitrates none  Devices: None EF greater than 35%   plan on repeating the echo in a couple of months on guideline directed therapy.  May consider switching to Arni and follow-up.    Essential hypertension  Blood pressure stable. Off of nifedipine now, doing well. See sinus tach plan below    Sinus tachycardia   Now s/p TAVR 09/01/2020   goal resting HR<80 we'll continue to monitor.    Interstitial lung disease (CMS/HCC)  Noted on CT - intermittently required steroids. Followed by pulmonary.     Mixed hyperlipidemia  On statin, LDL 89 per lipids 7/20. No changes.     Chemotherapy  Endometrial carcinoma on carboplatin and Taxol.  Epicardium will be ordered in 1 month given her  depressed ejection fraction and will monitor closely.    There are no discontinued medications.    Return in about 3 months (around 4/27/2021).    On 1/27/2021, ICharles scribed the services personally performed by Rena Huff MD    I have reviewed and revised the note above.  The documentation recorded by the scribe accurately reflects history obtained, actions performed and decisions made by me    Rena Huff MD, [facility]  [facility]  [facility]      A letter has been sent to the referring physician.      This note was created by [employer] voice recognition. Errors in content may be related to improper recognition by the system; efforts to review and correct have been done but errors may still exist.

## 2021-12-09 ENCOUNTER — VIRTUAL VISIT (OUTPATIENT)
Dept: PSYCHOLOGY | Age: 66
End: 2021-12-09
Payer: MEDICARE

## 2021-12-09 DIAGNOSIS — F41.9 ANXIETY: Primary | ICD-10-CM

## 2021-12-09 PROCEDURE — 90832 PSYTX W PT 30 MINUTES: CPT | Performed by: SOCIAL WORKER

## 2021-12-09 NOTE — PROGRESS NOTES
Behavioral Health Consultation- Follow UP  PAUL Medina  12/9/2021   1:52 PM      Gurdeep Hillucier, was evaluated through a synchronous (real-time) audio-video encounter. The patient (or guardian if applicable) is aware that this is a billable service. Verbal consent to proceed has been obtained within the past 12 months. The visit was conducted pursuant to the emergency declaration under the [address] [place] [address] authority and the [employer] Act. Patient identification was verified, and a caregiver was present when appropriate. The patient was located in a state where the provider was credentialed to provide care. Time spent with Patient: 33 minutes  This is patient's fourth  [facility] appointment. Reason for Consult:    Chief Complaint   Patient presents with    Anxiety     Referring Provider: Jenny Becker DO  [address] 50    Patient provided informed consent for the behavioral health program. Discussed with patient model of service to include the limits of confidentiality (i.e. abuse reporting, suicide intervention, etc.) and short-term intervention focused approach. Pt indicated understanding. Feedback given to PCP. Verified the following information:  Patient's identification: Yes  Patient location: [address] [place] [address]   Patient's call back number: [phone]   Patient's emergency contact's name and number, as well as permission to contact them if needed: Extended Emergency Contact Information  Primary Emergency Contact: Rina DILL  Address: [address] [place] [address] Melyssa Jacobson [address] Phone: [phone]  Work Phone: [phone]  Relation: Spouse     Provider location: [address]:  Last appointment 11/18/21    Pt reports doing very well.  Pt reports a significant improvement in anxiety since starting [employer] [facility] services. Pt reports that she has gained confidence in her ability to handle unexpected situations. Pt states that she has been feeling more validated in her thinking/emotions. Pt reports an improvement in relationship with her  as she has gained more confidence. Discussed relationship patterns and positive effects of this change. Pt reports improvement in sleep. Pt's daughter will be induced on Monday for medical reasons. Pt has been able to manage anxiety around this. Pt reports significant decrease in anxiety. Pt reports decrease in work stress has been helpful in her progress. Pt has been engaging in increased self-care, and using mindfulness. This has been helping with balance. Discussed care taking past and change in thinking about focusing on self. Pt reports feeling calmness and cassy.      O:  MSE:    Appearance: good hygiene   Attitude: cooperative and friendly  Consciousness: alert  Orientation: oriented to person, place, time, general circumstance  Memory    recent and remote memory intact  Attention/Concentration    intact  Psychomotor Activity:normal  Eye Contact: normal  Speech: normal rate and volume, well-articulated  Mood    upbeat  Affect    normal affect  Perception: within normal limits  Thought Content: within normal limits  Thought Process: logical, coherent and goal-directed  Associations    logical connections  Insight: good  Judgment    Intact  Appetite normal  Sleep disturbance No  Fatigue No  Loss of pleasure No  Impulsive behavior No  Morbid Ideation: no   Suicide Assessment: no suicidal ideation, plan, or intent  Homicidal Ideation: no    History:    Medications:   Current Outpatient Medications   Medication Sig Dispense Refill    sertraline (ZOLOFT) 25 MG tablet Take 1 tablet by mouth daily 30 tablet 1    hydroCHLOROthiazide (MICROZIDE) 12.5 MG capsule TAKE ONE CAPSULE BY MOUTH EVERY MORNING 90 capsule 3    denosumab (PROLIA) 60 MG/ML SOSY SC injection Inject 1 mL into the skin once for 1 dose 1 mL 0    Estradiol (VAGIFEM) 10 MCG TABS vaginal tablet Place 1 tablet vaginally Twice a Week       Multiple Vitamins-Minerals (THERAPEUTIC MULTIVITAMIN-MINERALS) tablet Take 1 tablet by mouth daily. Vit D 1000 units       No current facility-administered medications for this visit. 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[employer], , 2 children, [place], likes to walk     Social Determinants of Health     Financial Resource Strain:     Difficulty of Paying Living Expenses: Not on file   Food Insecurity:     Worried About [address] in the Last Year: Not on file    [address] in the Last Year: Not on file   Transportation Needs:     Lack of Transportation (Medical): Not on file    Lack of Transportation (Non-Medical):  Not on file   Physical Activity:     Days of Exercise per Week: Not on file    Minutes of Exercise per Session: Not on file   Stress:     Feeling of Stress : Not on file   Social Connections:     Frequency of Communication with Friends and Family: Not on file    Frequency of Social Gatherings with Friends and Family: Not on file    Attends Congregational Services: Not on file    Active Member of Clubs or Organizations: Not on file    Attends Club or Organization Meetings: Not on file    Marital Status: Not on file   Intimate Partner Violence:     Fear of Current or Ex-Partner: Not on file    Emotionally Abused: Not on file    Physically Abused: Not on file    Sexually Abused: Not on file   Housing Stability:     Unable to Pay for Housing in the Last Year: Not on file    Number of Places Lived in the Last Year: Not on file    Unstable Housing in the Last Year: Not on file     TOBACCO:   reports that she has never smoked. She has never used smokeless tobacco.  ETOH:   reports current alcohol use. Family History:   Family History   Problem Relation Age of Onset    High Blood Pressure Other          A:  Patient endorses stable mood. Denies SI/HI, with good insight and motivation. Diagnosis:    1.  Anxiety          Past Diagnosis:        Diagnosis Date    Arthritis     Club Foot 1956    left    Osteoporosis     Plantar fasciitis          Plan:  Patient interventions:  Discussed self-care (sleep, nutrition, rewarding activities, social support, exercise)  Trained in strategies for increasing balanced thinking  Supportive techniques  Promoted hope using strengths-based approach    Patient Behavioral Change Plan:   See Patient Instructions

## 2022-01-11 ENCOUNTER — OFFICE VISIT (OUTPATIENT)
Dept: ENDOCRINOLOGY | Age: 67
End: 2022-01-11
Payer: MEDICARE

## 2022-01-11 ENCOUNTER — PROCEDURE VISIT (OUTPATIENT)
Dept: ENDOCRINOLOGY | Age: 67
End: 2022-01-11

## 2022-01-11 ENCOUNTER — HOSPITAL ENCOUNTER (OUTPATIENT)
Dept: GENERAL RADIOLOGY | Age: 67
Discharge: HOME | End: 2022-01-11
Payer: MEDICARE

## 2022-01-11 VITALS
HEIGHT: 61 IN | WEIGHT: 120.6 LBS | DIASTOLIC BLOOD PRESSURE: 60 MMHG | SYSTOLIC BLOOD PRESSURE: 103 MMHG | BODY MASS INDEX: 22.77 KG/M2

## 2022-01-11 DIAGNOSIS — R82.994 HYPERCALCIURIA: Chronic | ICD-10-CM

## 2022-01-11 DIAGNOSIS — M81.0 OSTEOPOROSIS, POSTMENOPAUSAL: Chronic | ICD-10-CM

## 2022-01-11 DIAGNOSIS — M81.0 OSTEOPOROSIS, POSTMENOPAUSAL: Primary | Chronic | ICD-10-CM

## 2022-01-11 DIAGNOSIS — Z51.81 MEDICATION MONITORING ENCOUNTER: Chronic | ICD-10-CM

## 2022-01-11 DIAGNOSIS — E55.9 VITAMIN D DEFICIENCY: Chronic | ICD-10-CM

## 2022-01-11 DIAGNOSIS — M81.0 OSTEOPOROSIS, POSTMENOPAUSAL: ICD-10-CM

## 2022-01-11 PROCEDURE — 99214 OFFICE O/P EST MOD 30 MIN: CPT | Performed by: INTERNAL MEDICINE

## 2022-01-11 PROCEDURE — 77080 DXA BONE DENSITY AXIAL: CPT | Performed by: INTERNAL MEDICINE

## 2022-01-11 PROCEDURE — 77080 DXA BONE DENSITY AXIAL: CPT

## 2022-01-11 NOTE — RESULT ENCOUNTER NOTE
[facility]) Osteoporosis and [address] [facility], [address],[place] [address]  Phone [phone]  Fax [phone]    NAME: Karolina Castro   : 1955   STUDY DATE: 2022     REFERRING PROVIDER:  Nayla Puente MD    INDICATION(S) FOR PERFORMING THE STUDY:  osteoporosis, age-related (M81.0)    CLINICAL INFORMATION PROVIDED BY THE PATIENT: 57-year-old woman. She started natural menopause at age 48. She has not taken estrogen No history of fragility fractures. No long-term corticosteroid use. She took alendronate 2009-2010 and again [id number]. She is now being treated with Prolia (started 2014). Family history of osteoporosis (sister with a hip fracture, mother). EQUIPMENT: [employer]. POSITIONING: Good. REGIONS OF INTEREST: Correct     ARTIFACTS: None. STUDY VALID? Yes. Spine BMD is spuriously high because of generalized degenerative change;  no adjustments were made. T-scores  Initial study: 10/17/2008 L1-L4 -2.9 left fem. neck NA   Current study: 2022 L1-L4 -2.1 left fem. neck -2.4     The table below shows bone mineral density (grams/cm2), the appropriate measure for comparing serial scans. An increase or decrease is significant based on precision studies done at our center according to the ISCD protocol. PA spine Proximal Femur (left)   Date L1-L4 Fem. neck Trochanter Total hip   10/17/2008 0.850 Invalid Invalid Invalid   2010 0.762 0.567 0.563 Invalid   2011 0.733 0.587 0.561 0.716   2013 0.699 0.563 0.540 0.675   2014 0.703 0.546 0.550 0.690   2015 0.731 0.558 0.582 0.716   2017 0.748 0.584 0.610 0.737   2019 0.782 0.580 0.628 0.753   2020 0.795 0.600 0.632 0.753   2022 0.812 0.586 0.628 0.756     IMPRESSION:  BONE DENSITY IS LOW, CONSISTENT WITH OSTEOPOROSIS. COMPARED WITH , BEFORE STARTING PROLIA, BMD IS HIGHER NOW AT ALL SITES MEASURED.  SINCE THE LAST DXA, BMD DID NOT CHANGE SIGNIFICANTLY IN THE SPINE OR LEFT HIP. Consider repeating this study in 1-2 years to assess the patient's progress. _________________________________________________   Sofia Clark MD, Director, [facility]) Osteoporosis and [address] [place] [address]

## 2022-01-11 NOTE — PROGRESS NOTES
[facility]) Osteoporosis and [address]  [facility] [address] [facility], [address]  Phone [phone]  Fax [phone]    NAME: Mitchell Honeycutt OF BIRTH: 1955  INITIAL CONSULTATION: 05/10/2011  LAST OFFICE VISIT: 11/30/2020  TODAY'S VISIT: 01/11/2022    Labs @ [address] 10/2021    PROBLEMS:   Osteoporosis by DXA 11/2010, lowest T-score -2.6 in the spine    Family history of osteoporosis, sister with a hip fracture, mother    Low bone density by DXA 10/2008, lowest T-score -1.8 in the spine    Fractured foot 2012 (slipped)    Natural menopause age 48 (2009)  Hypercalciuria, normal 25-h urine calcium for her is 100-215    330 mg/d10/2011 (she did not want to take HCTZ at that time    294 mg/d 08/2015 on no medication    172 mg/d 01/2016 with HCTZ 12.5 mg/d  Left club foot  Vitamin D, desirable 25-OH D is 30-60 ng/mL    30 ng/mL 08/2010 on 1600 IU/d    18 ng/mL 11/2010 on ergocalciferol 50,000 weekly (08/2010-11/2010)    34 ng/mL 07/2011 on cholecalciferol 1600 IU/d    39 ng/mL 07/2013 on vitamin D 1000 IU/d    45 ng/mL 08/2015 with vitamin D 1000 IU/d    47 ng/mL 10/2020    44 ng/mL 10/2021    CURRENT MANAGEMENT FOR BONE HEALTH:   Calcium: 900 mg/d diet + 500 mg/d with multivitamin = 1400 mg/d    300 mg other, 300 mg milk, 300 mg yogurt  Vitamin D: 1000 IU/d in MVI  Exercise: walks outside 3 x weekly  Pharmacologic therapy:  Prolia 60 mg SQ twice yearly started 07/2014    PREVIOUS MEDICATIONS FOR OSTEOPOROSIS:   Fosamax 35 mg weekly 09/2009-04/2010, stopped by dentist  Alendronate 07/2013-07/2014, changed to Prolia with hopes of better BMD increase    OTHER CURRENT MEDICATIONS (SELECTED): None  OTC MEDICATIONS: None    CHIEF COMPLAINT: Here for f/u visit of osteoporosis and vitamin D deficiency, monitoring treatment. No new related signs or symptoms.     PAST MEDICAL HISTORY, FAMILY HISTORY, SOCIAL HISTORY AND REVIEW OF SYSTEMS:  Relevant changes since last visit (see patient questionnaire of todays date). INTERVAL HISTORY: See problem list for chronic/inactive conditions. She received Prolia and continued HCTZ without side effects. No falls, near-falls or fractures. Feels well overall. NEUROLOGIC EXAM: Able to rise from chair without using arms. No apparent focal motor or sensory deficit. Reflexes brisk and symmetric. Coordination appears normal.  MUSCULOSKELETAL EXAM: Gait: Intact without difficulty. Steady without assistance. Spine: Spinal contours are normal.  No spine tenderness to palpation or percussion. Ribs and pelvis: Ribs appear normal. Two finger spaces between ribs and pelvis. BONE DENSITY: Most recent done here using [employer] equipment. T-scores  Initial study: 10/17/2008 L1-L4 -2.9 left fem. neck NA   Current study: 01/11/2022 L1-L4 -2.1 left fem. neck -2.4     The table below shows bone mineral density (grams/cm2), the appropriate measure for comparing serial scans. An increase or decrease is significant based on precision studies done at our center according to the ISCD protocol. PA spine Proximal Femur (left)   Date L1-L4 Fem. neck Trochanter Total hip   10/17/2008 0.850 Invalid Invalid Invalid   11/22/2010 0.762 0.567 0.563 Invalid   11/09/2011 0.733 0.587 0.561 0.716   07/09/2013 0.699 0.563 0.540 0.675   07/22/2014 0.703 0.546 0.550 0.690   07/28/2015 0.731 0.558 0.582 0.716   08/29/2017 0.748 0.584 0.610 0.737   11/26/2019 0.782 0.580 0.628 0.753   11/30/2020 0.795 0.600 0.632 0.753   01/11/2022 0.812 0.586 0.628 0.756     IMPRESSION:  BONE DENSITY IS LOW, CONSISTENT WITH OSTEOPOROSIS. COMPARED WITH 2014, BEFORE STARTING PROLIA, BMD IS HIGHER NOW AT ALL SITES MEASURED. SINCE THE LAST DXA, BMD DID NOT CHANGE SIGNIFICANTLY IN THE SPINE OR LEFT HIP. LABS. 01/2016 Ca 10.0, Cr 0.8. 03/2016 CBC Ca 9.8 Cr 0.6. 07/2018 Ca 9.4 Cr 0.7. 05/2019 Ca 9.6 Cr 0.7.  10/2020 Ca 9.6 Cr 0.6. 10/2021 Ca 9.5 Cr 0.8. IMAGING. DXA printouts reviewed. 03/2016 chest CT.    ASSESSMENT: Osteoporosis with bone density lower than desirable due in part to her family history. BMD decreased from 2008 to 11/2010 at the spine. Although stable since, BMD is still quite low. She is doing well with Prolia started 07/2014. Vitamin D deficiency has been corrected. Hypercalciuria is controlled with HCTZ 12.5 mg/d. PLANS: Continue HCTZ 12.5 mg/d. Continue treatment with Prolia 60 mg SQ twice yearly (next dose scheduled 05/05/2022). Return appointment with DXA in 1 year. Time spent today: 30-40 minutes. Douglas Clark MD, Director, [facility]) Osteoporosis and Bone Health Services    CC: Dwain Bosworth MD Walker Purple MD

## 2022-01-12 NOTE — PROGRESS NOTES
Behavioral Health Consultation- Follow UP  PAUL Villeda  1/13/2022   10:49 AM      Iliana Killian, was evaluated through a synchronous (real-time) audio-video encounter. The patient (or guardian if applicable) is aware that this is a billable service. Verbal consent to proceed has been obtained within the past 12 months. The visit was conducted pursuant to the emergency declaration under the [address] [place] [address] authority and the [employer] and [employer] Act. Patient identification was verified, and a caregiver was present when appropriate. The patient was located in a state where the provider was credentialed to provide care. Time spent with Patient: 39 minutes  This is patient's fifth  [facility] appointment. Reason for Consult:    Chief Complaint   Patient presents with    Anxiety     Referring Provider: Ton Pinon DO  [address]    Patient provided informed consent for the behavioral health program. Discussed with patient model of service to include the limits of confidentiality (i.e. abuse reporting, suicide intervention, etc.) and short-term intervention focused approach. Pt indicated understanding. Feedback given to PCP.     Verified the following information:  Patient's identification: Yes  Patient location: [address] [place] [address]   Patient's call back number: [phone]   Patient's emergency contact's name and number, as well as permission to contact them if needed: Extended Emergency Contact Information  Primary Emergency Contact: Rina DILL  Address: [address] [place] [address] Phone: [phone]  Work Phone: [phone]  Relation: Spouse     Provider location: [address]:  Last appointment 12/9/21    Pt reports that things are feeling harder, and she is feeling more overwhelmed and anxious. Pt reports level of anxiety, although present, is less than it has been in the past. Pt continues to sleep well. Pt's daughter had her son, and Pt is helping with care. Pt's son got engaged. He is moran and Pt continues to worry about him. Discussed concerns. Pt reports they have a trip planned to [facility], and is having concerns about traveling during [facility]. Additionally, Pt hasn't received her passport. Pt is working on radical acceptance. Pt reports that they have been invited to attend a family party this week. Pt discussed family dynamics, different COVID beliefs and how to balance these.     O:  MSE:    Appearance: good hygiene   Attitude: cooperative and friendly  Consciousness: alert  Orientation: oriented to person, place, time, general circumstance  Memory    recent and remote memory intact  Attention/Concentration    intact  Psychomotor Activity:normal  Eye Contact: normal  Speech: normal rate and volume, well-articulated  Mood    Anxious  Affect    normal affect  Perception: within normal limits  Thought Content: within normal limits  Thought Process: logical, coherent and goal-directed  Associations    logical connections  Insight: good  Judgment    Intact  Appetite normal  Sleep disturbance No  Fatigue No  Loss of pleasure No  Impulsive behavior No  Morbid Ideation: no   Suicide Assessment: no suicidal ideation, plan, or intent  Homicidal Ideation: no    History:    Medications:   Current Outpatient Medications   Medication Sig Dispense Refill    sertraline (ZOLOFT) 25 MG tablet Take 1 tablet by mouth daily (Patient not taking: Reported on 1/11/2022) 30 tablet 1    hydroCHLOROthiazide (MICROZIDE) 12.5 MG capsule TAKE ONE CAPSULE BY MOUTH EVERY MORNING 90 capsule 3    denosumab (PROLIA) 60 MG/ML SOSY SC injection Inject 1 mL into the skin once for 1 dose 1 mL 0    Estradiol (VAGIFEM) 10 MCG TABS vaginal tablet Place 1 tablet vaginally Twice a Week       Multiple Vitamins-Minerals (THERAPEUTIC MULTIVITAMIN-MINERALS) tablet Take 1 tablet by mouth daily. Vit D 1000 units       No current facility-administered medications for this visit. 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[employer], , 2 children, [place], likes to walk     Social Determinants of Health     Financial Resource Strain:     Difficulty of Paying Living Expenses: Not on file   Food Insecurity:     Worried About [address] in the Last Year: Not on file    [address] in the Last Year: Not on file   Transportation Needs:     Lack of Transportation (Medical): Not on file    Lack of Transportation (Non-Medical):  Not on file   Physical Activity:     Days of Exercise per Week: Not on file    Minutes of Exercise per Session: Not on file   Stress:     Feeling of Stress : Not on file   Social Connections:     Frequency of Communication with Friends and Family: Not on file    Frequency of Social Gatherings with Friends and Family: Not on file    Attends [employer]ism Services: Not on file    Active Member of Clubs or Organizations: Not on file    Attends Club or Organization Meetings: Not on file    Marital Status: Not on file   Intimate Partner Violence:     Fear of Current or Ex-Partner: Not on file    Emotionally Abused: Not on file    Physically Abused: Not on file    Sexually Abused: Not on file   Housing Stability:     Unable to Pay for Housing in the Last Year: Not on file    Number of Places Lived in the Last Year: Not on file    Unstable Housing in the Last Year: Not on file     TOBACCO:   reports that she has never smoked. She has never used smokeless tobacco.  ETOH:   reports current alcohol use. Family History:   Family History   Problem Relation Age of Onset    High Blood Pressure Other          A:  Patient endorses stable mood. Denies SI/HI, with good insight and motivation. Diagnosis:    1.  Anxiety          Past Diagnosis:        Diagnosis Date    Arthritis    [address] 53    left    Osteoporosis     Plantar fasciitis          Plan:  Patient interventions:  Trained in strategies for increasing balanced thinking  Discussed boundary setting in relationships  Supportive techniques  Promoted hope using strengths-based approach   Discussed application of radical acceptance    Patient Behavioral Change Plan:   See Patient Instructions

## 2022-01-13 ENCOUNTER — VIRTUAL VISIT (OUTPATIENT)
Dept: PSYCHOLOGY | Age: 67
End: 2022-01-13
Payer: MEDICARE

## 2022-01-13 DIAGNOSIS — F41.9 ANXIETY: Primary | ICD-10-CM

## 2022-01-13 PROCEDURE — 90834 PSYTX W PT 45 MINUTES: CPT | Performed by: SOCIAL WORKER

## 2022-01-13 NOTE — PATIENT INSTRUCTIONS
1. Talk with your  about attending the party this weekend. 2. Set boundaries with others regarding your values and emotions. 3. Continue with self-care activities and mindfulness.

## 2022-02-15 ENCOUNTER — VIRTUAL VISIT (OUTPATIENT)
Dept: FAMILY MEDICINE CLINIC | Age: 67
End: 2022-02-15
Payer: MEDICARE

## 2022-02-15 DIAGNOSIS — Z00.00 MEDICARE ANNUAL WELLNESS VISIT, SUBSEQUENT: Primary | ICD-10-CM

## 2022-02-15 DIAGNOSIS — U07.1 COVID: ICD-10-CM

## 2022-02-15 PROCEDURE — G0439 PPPS, SUBSEQ VISIT: HCPCS | Performed by: FAMILY MEDICINE

## 2022-02-15 ASSESSMENT — LIFESTYLE VARIABLES
HOW OFTEN DO YOU HAVE SIX OR MORE DRINKS ON ONE OCCASION: 0
AUDIT TOTAL SCORE: 2
HAS A RELATIVE, FRIEND, DOCTOR, OR ANOTHER HEALTH PROFESSIONAL EXPRESSED CONCERN ABOUT YOUR DRINKING OR SUGGESTED YOU CUT DOWN: 0
HOW OFTEN DURING THE LAST YEAR HAVE YOU BEEN UNABLE TO REMEMBER WHAT HAPPENED THE NIGHT BEFORE BECAUSE YOU HAD BEEN DRINKING: 0
HOW MANY STANDARD DRINKS CONTAINING ALCOHOL DO YOU HAVE ON A TYPICAL DAY: 0
AUDIT-C TOTAL SCORE: 2
HOW OFTEN DURING THE LAST YEAR HAVE YOU NEEDED AN ALCOHOLIC DRINK FIRST THING IN THE MORNING TO GET YOURSELF GOING AFTER A NIGHT OF HEAVY DRINKING: 0
HOW OFTEN DURING THE LAST YEAR HAVE YOU HAD A FEELING OF GUILT OR REMORSE AFTER DRINKING: 0
HOW OFTEN DO YOU HAVE A DRINK CONTAINING ALCOHOL: 2
HOW OFTEN DURING THE LAST YEAR HAVE YOU FOUND THAT YOU WERE NOT ABLE TO STOP DRINKING ONCE YOU HAD STARTED: 0
HAVE YOU OR SOMEONE ELSE BEEN INJURED AS A RESULT OF YOUR DRINKING: 0
HOW OFTEN DURING THE LAST YEAR HAVE YOU FAILED TO DO WHAT WAS NORMALLY EXPECTED FROM YOU BECAUSE OF DRINKING: 0

## 2022-02-15 ASSESSMENT — PATIENT HEALTH QUESTIONNAIRE - PHQ9
SUM OF ALL RESPONSES TO PHQ9 QUESTIONS 1 & 2: 0
SUM OF ALL RESPONSES TO PHQ QUESTIONS 1-9: 0
2. FEELING DOWN, DEPRESSED OR HOPELESS: 0
SUM OF ALL RESPONSES TO PHQ QUESTIONS 1-9: 0
1. LITTLE INTEREST OR PLEASURE IN DOING THINGS: 0

## 2022-02-15 NOTE — PROGRESS NOTES
Medicare Annual Wellness Visit  Name: Yas Miller Date: 2/15/2022   MRN: [id number] Sex: Female   Age: 77 y.o. Ethnicity: Non- / Non    : 1955 Race: White (non-)      Rachel Valentin is here for Medicare AWV (pt has covid)    Tested + 2 days ago--- came back from [facility],  was diagnosed in [place] and is about 1 week in, she has sneezing, nasal congestion, some body aching, but really feels quite good overall    Screenings for behavioral, psychosocial and functional/safety risks, and cognitive dysfunction are all negative except as indicated below. These results, as well as other patient data from the [address] form, are documented in Flowsheets linked to this Encounter. No Known Allergies      Prior to Visit Medications    Medication Sig Taking? Authorizing Provider   hydroCHLOROthiazide (MICROZIDE) 12.5 MG capsule TAKE ONE CAPSULE BY MOUTH EVERY MORNING Yes Freddie Resendez MD   denosumab (PROLIA) 60 MG/ML SOSY SC injection Inject 1 mL into the skin once for 1 dose Yes Freddie Resendez MD   Estradiol (VAGIFEM) 10 MCG TABS vaginal tablet Place 1 tablet vaginally Twice a Week  Yes Historical Provider, MD   Multiple Vitamins-Minerals (THERAPEUTIC MULTIVITAMIN-MINERALS) tablet Take 1 tablet by mouth daily.  Vit D 1000 units Yes Historical Provider, MD   sertraline (ZOLOFT) 25 MG tablet Take 1 tablet by mouth daily  Patient not taking: Reported on 2/15/2022  Raymundo Brady MD         Past Medical History:   Diagnosis Date   [address]    left    COVID 2022    Osteoporosis     Plantar fasciitis        Past Surgical History:   Procedure Laterality Date     SECTION  1985    COLONOSCOPY  2019    COLONOSCOPY POLYPECTOMY SNARE/COLD BIOPSY performed by Margaret Crum MD at [address]    club foot surgery    TOE SURGERY Right     TONSILLECTOMY           Family History   Problem Relation Age of Onset    High Blood Pressure Other        CareTeam (Including outside providers/suppliers regularly involved in providing care):   Patient Care Team:  Nhi Gray DO as PCP - General (Family Medicine)  Nhi Gray DO as PCP - [facility] Empaneled Provider  Aldo Camarillo MD as Consulting Physician (Otolaryngology)    Wt Readings from Last 3 Encounters:   01/11/22 120 lb 9.6 oz (54.7 kg)   10/12/21 118 lb (53.5 kg)   09/01/21 120 lb (54.4 kg)     No flowsheet data found. There is no height or weight on file to calculate BMI. Based upon direct observation of the patient, evaluation of cognition reveals recent and remote memory intact. GEN- NAD, pleasant  HEENT- Eyes wo icterus or injection  Lungs- No increased work of breathing appreciated  Psych- Euthymic    Patient's complete Health Risk Assessment and screening values have been reviewed and are found in Flowsheets. The following problems were reviewed today and where indicated follow up appointments were made and/or referrals ordered. Positive Risk Factor Screenings with Interventions:              General Health and ACP:  General  In general, how would you say your health is?: Excellent  In the past 7 days, have you experienced any of the following?  New or Increased Pain, New or Increased Fatigue, Loneliness, Social Isolation, Stress or Anger?: None of These  Do you get the social and emotional support that you need?: Yes  Do you have a Living Will?: Yes  Advance Directives     Power of [facility] Will ACP-Advance Directive ACP-Power of     Not on File Not on File Not on File Not on File            Safety:  Safety  Do you have working smoke detectors?: Yes  Have all throw rugs been removed or fastened?: Yes  Do you have non-slip mats or surfaces in all bathtubs/showers?: (!) No,, encouraged to do so  Do all of your stairways have a railing or banister?: Yes  Are your doorways, halls and stairs free of clutter?: Yes  Do you always fasten your seatbelt when you are in a car?: Yes  Safety Interventions:      Personalized Preventive Plan   Current Health Maintenance Status  Immunization History   Administered Date(s) Administered    COVID-19, Moderna, Primary or Immunocompromised, PF, 100mcg/0.5mL 02/05/2021, 03/05/2021, 11/04/2021    Influenza A (V2E3-60) Vaccine PF IM 10/21/2009    Influenza Virus Vaccine 10/11/2017, 09/28/2018, 09/30/2019    Influenza, High Dose (Fluzone 65 yrs and older) 10/05/2018    Influenza, Quadv, IM, (6 mo and older Fluzone, Flulaval, Fluarix and 3 yrs and older Afluria) 10/11/2017    Influenza, Quadv, IM, PF (6 mo and older Fluzone, Flulaval, Fluarix, and 3 yrs and older Afluria) 10/05/2020    Influenza, Quadv, adjuvanted, 65 yrs +, IM, PF (Fluad) 10/12/2021    Tdap (Boostrix, Adacel) 10/05/2020    Zoster Recombinant (Shingrix) 08/01/2019, 10/24/2019        Health Maintenance   Topic Date Due    Hepatitis C screen  Never done    Annual Wellness Visit (AWV)  Never done    Pneumococcal 65+ years Vaccine (1 of 1 - PPSV23) Never done    Potassium monitoring  10/14/2022    Creatinine monitoring  10/14/2022    Depression Screen  02/15/2023    Breast cancer screen  05/26/2023    Lipid screen  10/14/2026    Colon cancer screen colonoscopy  12/09/2029    DTaP/Tdap/Td vaccine (2 - Td or Tdap) 10/05/2030    DEXA (modify frequency per FRAX score)  Completed    Flu vaccine  Completed    Shingles Vaccine  Completed    COVID-19 Vaccine  Completed    Hepatitis A vaccine  Aged Out    Hepatitis B vaccine  Aged Out    Hib vaccine  Aged Out    Meningococcal (ACWY) vaccine  Aged Out     Recommendations for [place] Due: see orders and patient instructions/AVS.  . Recommended screening schedule for the next 5-10 years is provided to the patient in written form: see Patient Instructions/AVS.    Debborah Runner was seen today for medicare awv.     Diagnoses and all orders for this visit:    Medicare annual wellness visit, subsequent    COVID             She will consider starting pneumococcal vaccines--- would recommend waiting 6 weeks or so post Covid    In regards to her Covid, she will continue with supportive care, I have recommended that she take vitamin 2000 units, melatonin 3 to 5 mg at bedtime, vitamin c 500 mg, and zinc 50 mg for about 6 weeks, she is to call or be evaluated with worsening symptoms, CDC isolation guidelines reviewed    Nestor Marques is a 77 y.o. female being evaluated by a Virtual Visit (video and audio) encounter to address concerns as mentioned above. A caregiver was present when appropriate. Due to this being a TeleHealth encounter (During UYIYR-26 public health emergency), evaluation of the following organ systems was limited: Vitals/Constitutional/EENT/Resp/CV/GI//MS/Neuro/Skin/Heme-Lymph-Imm. Pursuant to the emergency declaration under the [address] [place] [address] authority and the [employer] and [employer] Act, this Virtual Visit was conducted with patient's (and/or legal guardian's) consent, to reduce the patient's risk of exposure to COVID-19 and provide necessary medical care. The patient (and/or legal guardian) has also been advised to contact this office for worsening conditions or problems, and seek emergency medical treatment and/or call 911 if deemed necessary. Patient identification was verified at the start of the visit: Yes    Services were provided through a video synchronous discussion virtually to substitute for in-person clinic visit. Patient and provider were located at their individual homes. --Meg Stiles DO on 2/15/2022 at 8:36 AM    An electronic signature was used to authenticate this note.

## 2022-02-15 NOTE — PROGRESS NOTES
Behavioral Health Consultation- Follow UP  PAUL Negrete  2/17/2022   12:17 PM      Pilar Lugo, was evaluated through a synchronous (real-time) audio-video encounter. The patient (or guardian if applicable) is aware that this is a billable service, which includes applicable co-pays. This Virtual Visit was conducted with patient's (and/or legal guardian's) consent. . Verbal consent to proceed has been obtained within the past 12 months. The visit was conducted pursuant to the emergency declaration under the [facility], [facility] authority and the [employer] and [employer] Act. Patient identification was verified, and a caregiver was present when appropriate. The patient was located in a state where the provider was licensed to provide care. Time spent with Patient: 43 minutes  This is patient's sixth  [facility] appointment. Reason for Consult:    Chief Complaint   Patient presents with    Follow-up     Referring Provider: Vicenta Barahona DO  [address]    Patient provided informed consent for the behavioral health program. Discussed with patient model of service to include the limits of confidentiality (i.e. abuse reporting, suicide intervention, etc.) and short-term intervention focused approach. Pt indicated understanding. Feedback given to PCP.     Verified the following information:  Patient's identification: Yes  Patient location: [address] [place] [address]   Patient's call back number: [phone]   Patient's emergency contact's name and number, as well as permission to contact them if needed: Extended Emergency Contact Information  Primary Emergency Contact: Rina DILL  Address: [address] [place] [address] Phone: [phone]  Work Phone: [phone]  Relation: Spouse     Provider location: [place], [place] S:  Last appointment 1/13/22    Pt reports that she and her  traveled to [facility], and had a great vacation. The day before they were scheduled to leave, her  tested positive for Covid. Pt reports that the health authorities took her  to an undisclosed location that evening, in an unmarked [place], and they both were very scared. Pt states that she found him later and was able to assist him. Pt continues to have some dreams about the events. Pt shared about the events over the next several days, and the decision making process that she and her  engaged in to make the best decisions for them. Pt reports being proud of herself for handling the stressful situations. Pt states she feels that she got her old self back, and feels like herself again. Since being home, Pt has tested positive for Covid, but has no sxs. Pt has been in contact with her PCP regarding the diagnosis. Pt has increased confidence in her ability to handle stress and challenges. Pt feels that she does not need additional [facility] support at this time, and will reach out to provider in the future, if needed.      O:  MSE:    Appearance: good hygiene   Attitude: cooperative and friendly  Consciousness: alert  Orientation: oriented to person, place, time, general circumstance  Memory    recent and remote memory intact  Attention/Concentration    intact  Psychomotor Activity:normal  Eye Contact: normal  Speech: normal rate and volume, well-articulated  Mood    Euthymic  Affect    normal affect  Perception: within normal limits  Thought Content: within normal limits  Thought Process: logical, coherent and goal-directed  Associations    logical connections  Insight: good  Judgment    Intact  Appetite normal  Sleep disturbance No  Fatigue No  Loss of pleasure No  Impulsive behavior No  Morbid Ideation: no   Suicide Assessment: no suicidal ideation, plan, or intent  Homicidal Ideation: no    History:    Medications:   Current Outpatient Medications   Medication Sig Dispense Refill    sertraline (ZOLOFT) 25 MG tablet Take 1 tablet by mouth daily (Patient not taking: Reported on 2/15/2022) 30 tablet 1    hydroCHLOROthiazide (MICROZIDE) 12.5 MG capsule TAKE ONE CAPSULE BY MOUTH EVERY MORNING 90 capsule 3    denosumab (PROLIA) 60 MG/ML SOSY SC injection Inject 1 mL into the skin once for 1 dose 1 mL 0    Estradiol (VAGIFEM) 10 MCG TABS vaginal tablet Place 1 tablet vaginally Twice a Week       Multiple Vitamins-Minerals (THERAPEUTIC MULTIVITAMIN-MINERALS) tablet Take 1 tablet by mouth daily. Vit D 1000 units       No current facility-administered medications for this visit. 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[address], , 2 children, was in [place], likes to walk     Social Determinants of Health     Financial Resource Strain:     Difficulty of Paying Living Expenses: Not on file   Food Insecurity:     Worried About [address] in the Last Year: Not on file    [address] in the Last Year: Not on file   Transportation Needs:     Lack of Transportation (Medical): Not on file    Lack of Transportation (Non-Medical):  Not on file   Physical Activity:     Days of Exercise per Week: Not on file    Minutes of Exercise per Session: Not on file   Stress:     Feeling of Stress : Not on file   Social Connections:     Frequency of Communication with Friends and Family: Not on file    Frequency of Social Gatherings with Friends and Family: Not on file    Attends Yarsanism Services: Not on file    Active Member of Clubs or Organizations: Not on file    Attends Club or Organization Meetings: Not on file    Marital Status: Not on file   Intimate Partner Violence:     Fear of Current or Ex-Partner: Not on file    Emotionally Abused: Not on file    Physically Abused: Not on file    Sexually Abused: Not on file   Housing Stability:     Unable to Pay for Housing in the Last Year: Not on file    Number of [place] in the Last Year: Not on file    Unstable Housing in the Last Year: Not on file     TOBACCO:   reports that she has never smoked. She has never used smokeless tobacco.  ETOH:   reports current alcohol use. Family History:   Family History   Problem Relation Age of Onset    High Blood Pressure Other          A:  Patient endorses stable mood. Denies SI/HI, with good insight and motivation. Diagnosis:    1.  Anxiety          Past Diagnosis:        Diagnosis Date    Arthritis    [address]    left    COVID 02/2022    Osteoporosis     Plantar fasciitis          Plan:  Patient interventions:  Emphasized self-care as important for managing overall health  Trained in strategies for increasing balanced thinking  Supportive techniques  Promoted hope using strengths-based approach    Patient Behavioral Change Plan:   See Patient Instructions

## 2022-02-17 ENCOUNTER — TELEMEDICINE (OUTPATIENT)
Dept: PSYCHOLOGY | Age: 67
End: 2022-02-17
Payer: MEDICARE

## 2022-02-17 DIAGNOSIS — F41.9 ANXIETY: Primary | ICD-10-CM

## 2022-02-17 PROCEDURE — 90834 PSYTX W PT 45 MINUTES: CPT | Performed by: SOCIAL WORKER

## 2022-04-07 ENCOUNTER — TELEPHONE (OUTPATIENT)
Dept: ENDOCRINOLOGY | Age: 67
End: 2022-04-07

## 2022-05-05 ENCOUNTER — NURSE ONLY (OUTPATIENT)
Dept: ENDOCRINOLOGY | Age: 67
End: 2022-05-05
Payer: MEDICARE

## 2022-05-05 DIAGNOSIS — M81.0 OSTEOPOROSIS, POSTMENOPAUSAL: Chronic | ICD-10-CM

## 2022-05-05 PROCEDURE — 96372 THER/PROPH/DIAG INJ SC/IM: CPT | Performed by: INTERNAL MEDICINE

## 2022-05-05 NOTE — PATIENT INSTRUCTIONS
Prolia supplied by the physician office. It is the patient's responsibility to notify the physician office of new insurance plan or new identification number prior to appointment to prevent delay in treatment. Please send a copy of the front and back of the insurance card either by fax, mail or stop by the office.

## 2022-06-29 ENCOUNTER — TELEPHONE (OUTPATIENT)
Dept: FAMILY MEDICINE CLINIC | Age: 67
End: 2022-06-29

## 2022-06-29 DIAGNOSIS — R11.0 NAUSEA: Primary | ICD-10-CM

## 2022-06-29 RX ORDER — ONDANSETRON 4 MG/1
4 TABLET, ORALLY DISINTEGRATING ORAL 3 TIMES DAILY PRN
Qty: 21 TABLET | Refills: 0 | Status: SHIPPED | OUTPATIENT
Start: 2022-06-29 | End: 2022-07-26

## 2022-06-29 NOTE — TELEPHONE ENCOUNTER
Most likely a GI bug. Continue to push fluids to avoid dehydration. I can send in an anti-nausea medication such as zofran if patient would like. If diarrhea does not continue to improve then would recommend office visit to rule out infection such as Cdiff. Please document call and then close encounter.   thanks

## 2022-06-29 NOTE — TELEPHONE ENCOUNTER
Zofran sent. Patient could try taking an imodium for diarrhea however I do not normally recommend it. Its best to let the virus run its course. Please document call and then close encounter.   thanks

## 2022-06-29 NOTE — TELEPHONE ENCOUNTER
Spoke with patient about below message. She would like the zofran. Patient states her diarrhea is more watery than stool.

## 2022-06-29 NOTE — TELEPHONE ENCOUNTER
Patient states early Monday morning she vomited several times and then had watery diarrhea. States later that day she ate some rice and a banana. States she was pushing fluids including gatorade and water. States Tuesday morning the nausea feeling came back overnight and now every thing is running straight through her as watery diarrhea. Patient is concerned she may have food poisoning or some sore of stomach bug. She doesn't know if she needs to be seen or what the doctor recommends. Please advise.

## 2022-07-01 NOTE — TELEPHONE ENCOUNTER
She really should be evaluated. It would not be safe to prescribe her medication to stop the diarrhea due to the possibility of bacterial infection especially. Using antidiarrheal meds in those cases can cause serious complications. She likely needs stool testing.

## 2022-07-01 NOTE — TELEPHONE ENCOUNTER
Spoke with patient about below message and she was upset that she was just now getting a return call back because she wanted to be seen today. I informed patient that Dr LLAMAS was the only doctor here and that she was busy. I asked if she wanted to make an appt she said she was leaving for [place] tomorrow and she will see about making one when she gets back. I advise her to go ED or urgent care if things gets worse and patient stated she should not have to because that is what her PCP is for. I apologized for the late response and stated again it was not intentional that Dr LLAMAS was the only one working.

## 2022-07-01 NOTE — TELEPHONE ENCOUNTER
Pt is calling states that she is still having the watery diarrhea. States that she has had this for 4 days. States that it doesn't seem to be resolving. She is asking if there is something that can be called in help. She was advised yesterday not to take the immodium so she doesn't know what to do. Please advise. States that she is getting on a plane tomorrow and concerned.

## 2022-07-11 ENCOUNTER — HOSPITAL ENCOUNTER (OUTPATIENT)
Dept: WOMENS IMAGING | Age: 67
Discharge: HOME | End: 2022-07-11
Payer: MEDICARE

## 2022-07-11 DIAGNOSIS — Z12.31 BREAST CANCER SCREENING BY MAMMOGRAM: ICD-10-CM

## 2022-07-11 PROCEDURE — 77063 BREAST TOMOSYNTHESIS BI: CPT

## 2022-07-18 ENCOUNTER — TELEPHONE (OUTPATIENT)
Dept: FAMILY MEDICINE CLINIC | Age: 67
End: 2022-07-18

## 2022-07-18 ENCOUNTER — TELEMEDICINE (OUTPATIENT)
Dept: FAMILY MEDICINE CLINIC | Age: 67
End: 2022-07-18
Payer: MEDICARE

## 2022-07-18 DIAGNOSIS — J01.80 ACUTE NON-RECURRENT SINUSITIS OF OTHER SINUS: Primary | ICD-10-CM

## 2022-07-18 PROCEDURE — 1123F ACP DISCUSS/DSCN MKR DOCD: CPT | Performed by: FAMILY MEDICINE

## 2022-07-18 PROCEDURE — 99213 OFFICE O/P EST LOW 20 MIN: CPT | Performed by: FAMILY MEDICINE

## 2022-07-18 RX ORDER — BENZONATATE 100 MG/1
100 CAPSULE ORAL 3 TIMES DAILY PRN
Qty: 30 CAPSULE | Refills: 0 | Status: SHIPPED | OUTPATIENT
Start: 2022-07-18 | End: 2022-07-26

## 2022-07-18 RX ORDER — UREA 10 %
1 LOTION (ML) TOPICAL DAILY
Qty: 30 TABLET | Refills: 0 | Status: SHIPPED | OUTPATIENT
Start: 2022-07-18 | End: 2022-07-26

## 2022-07-18 RX ORDER — AMOXICILLIN AND CLAVULANATE POTASSIUM 875; 125 MG/1; MG/1
1 TABLET, FILM COATED ORAL 2 TIMES DAILY
Qty: 16 TABLET | Refills: 0 | Status: SHIPPED | OUTPATIENT
Start: 2022-07-18 | End: 2022-07-26

## 2022-07-18 NOTE — PROGRESS NOTES
Appointment was done audiovisually. Patient gave consent for an audiovisual visit. Patient was in their state of residency[place] at time of visit. Parties involved in visit were myself, nurse, and patient. A/P:    Diagnosis Orders   1. Acute non-recurrent sinusitis of other sinus          Augmentin 875 mg twice daily prescribed. Supportive care recommended as well. Patient to call or be evaluated if symptoms worsen or fail to improve/resolve. O: There were no vitals taken for this visit. Gen- NAD, pleasant  HEENT- Eyes without icterus or injection  Lungs- no increased work of breathing appreciated  Psych- Appropriate    S: CC-sinus symptoms  HPI-patient reports starting with cold symptoms approximately 1 week ago. She has since developed pressure behind eyes, upper nasal pressure. She has slightly congested, but tight cough. She denies fever, chest pain, dyspnea. Her previous vomiting and diarrhea have resolved. She reports that she last checked herself for COVID yesterday and was negative. She reports her  was sick for couple weeks and he was negative for COVID numerous times.     ROS- Per HPI    Patient's medications, allergies, and past medical hx were reviewed

## 2022-07-18 NOTE — TELEPHONE ENCOUNTER
Pt is calling because she feels like she has a sinus infection. Looking for an appt. Please advise.      Onset- week ago    Headache  Facial pressure  Hoarse  Yellow mucus  Cough  Tickle in throat    Covid test- yesterday and NEGATIVE    Otc- no

## 2022-07-26 ENCOUNTER — OFFICE VISIT (OUTPATIENT)
Dept: DERMATOLOGY | Age: 67
End: 2022-07-26
Payer: MEDICARE

## 2022-07-26 DIAGNOSIS — L57.0 AK (ACTINIC KERATOSIS): ICD-10-CM

## 2022-07-26 DIAGNOSIS — L81.4 LENTIGINES: ICD-10-CM

## 2022-07-26 DIAGNOSIS — D48.5 NEOPLASM OF UNCERTAIN BEHAVIOR OF SKIN: ICD-10-CM

## 2022-07-26 DIAGNOSIS — D22.9 MULTIPLE NEVI: Primary | ICD-10-CM

## 2022-07-26 PROCEDURE — 11102 TANGNTL BX SKIN SINGLE LES: CPT | Performed by: DERMATOLOGY

## 2022-07-26 PROCEDURE — 1123F ACP DISCUSS/DSCN MKR DOCD: CPT | Performed by: DERMATOLOGY

## 2022-07-26 PROCEDURE — 99213 OFFICE O/P EST LOW 20 MIN: CPT | Performed by: DERMATOLOGY

## 2022-07-26 NOTE — PROGRESS NOTES
[facility]at[facility]  Corinna Turk MD  [phone]      [facility] THE  1955    77 y.o. female     Date of Visit: 2022    Chief Complaint: moles/lesions  Chief Complaint   Patient presents with    Skin Exam     Last seen:   *retired in   *saw Dr. Mock Nicely in the past    *sister diagnosed with breast cancer in 2019    History of Present Illness:    1. Here for evaluation of multiple asx pigmented lesions on the trunk and extremities, present for many years; no change in size/shape/color of any lesions; no bleeding lesions. 2. Hx AK. Few new lesions note on the nose. Asx.     3. F/u dry hands, fissuring fingertips. Worse with hand  at work but now retired. Has tried moisturizer. No significant worsening. 4. She has a concerning darkly pigmented lesion on the L upper outer arm. Asx. [place] of it. Previously addressed:   thickening of the L 2nd toenail over the past year. Walking more. Asx. No personal hx of skin cancer. She wears sunscreen regularly but is in the sun a lot - has a pool and a boat. Mother with hx of NMSC. Hx of many sunburns as a child/teen. Used tanning beds a few times in the past but none recently. 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  Retired in . She has 2 granddaughters + grandson -Fernando Chowdhury and Nura Cano - at [place] in [phone] and grandson born early     Review of Systems:  Gen: Feels well, good sense of health. Skin: No changing moles or lesions. Past Medical History, Family History, Surgical History, Medications and Allergies reviewed.     Past Medical History:   Diagnosis Date    Arthritis     Club Foot     left    COVID 2022    Osteoporosis     Plantar fasciitis        Past Surgical History:   Procedure Laterality Date     SECTION  1985    COLONOSCOPY  2019    COLONOSCOPY POLYPECTOMY SNARE/COLD BIOPSY performed by Kayli Guzman MD at [address] foot surgery    TOE SURGERY Right     TONSILLECTOMY         Outpatient Medications Marked as Taking for the 7/26/22 encounter (Office Visit) with Erendira Michaud MD   Medication Sig Dispense Refill    hydroCHLOROthiazide (MICROZIDE) 12.5 MG capsule TAKE ONE CAPSULE BY MOUTH EVERY MORNING 90 capsule 3    denosumab (PROLIA) 60 MG/ML SOSY SC injection Inject 1 mL into the skin once for 1 dose 1 mL 0    Estradiol (VAGIFEM) 10 MCG TABS vaginal tablet Place 1 tablet vaginally Twice a Week       Multiple Vitamins-Minerals (THERAPEUTIC MULTIVITAMIN-MINERALS) tablet Take 1 tablet by mouth daily. Vit D 1000 units         No Known Allergies      Physical Examination     Gen, well-appearing  FSE today except for underwear-covered areas    trunk and extremities with scattered brown macules and papules - darkest on the chest (photo below) - all stable in appearance compared to photo  Skin-colored papule in the gluteal cleft - stable  No AK's today  Fingers, webs with dry skin and mild erythema  L upper outer arm with 1 mm black macule    3-2015:        Assessment and Plan     1. Benign-appearing nevi and lentigines  2. Hx of AK - clear today  - Monitor for ABCD's of MM and si/sx of NMSC  Continue sun protection - OTC sunscreen with SPF 30-50+ recommended and reviewed usage  Encouraged skin check yearly (sooner if indicated), self checks  - monitor lesions on the chest  - cont to monitor nose at sites of previous AK's but clear today    3. Hx of hand and fingertip dermatitis - irritant/from dryness - clear  - cont DSC    4. L upper outer arm - r/o dysplastic nevus vs lentigo  - Shave biopsy performed after verbal consent obtained. Patient educated regarding risk of bleeding, infection, scar and educated on wound care. Skin cleansed with alcohol pad and site anesthetized with lido + epi. Aluminum chloride applied to site for hemostasis. Petrolatum ointment and bandage applied.   Specimen bottle labeled with patient information and site and specimen sent to [facility]. Previously Ed /trx x 2-3 for the chest if desired. May increase with cost incr of laser. F/u 1 year.

## 2022-07-26 NOTE — PATIENT INSTRUCTIONS

## 2022-08-01 LAB — DERMATOLOGY PATHOLOGY REPORT: NORMAL

## 2022-08-09 ENCOUNTER — TELEPHONE (OUTPATIENT)
Dept: DERMATOLOGY | Age: 67
End: 2022-08-09

## 2022-08-09 NOTE — TELEPHONE ENCOUNTER
Left outer arm-benign nevus. The patient has been notified of this information and all questions answered.

## 2022-08-09 NOTE — TELEPHONE ENCOUNTER
Pt is calling to get the results of the biopsy she had done on 7/26/22. Please call pt at [phone].

## 2022-08-10 NOTE — PROGRESS NOTES
Behavioral Health Consultation- Follow UP  PAUL Marin  8/12/2022   9:24 AM      Nadia Decker, was evaluated through a synchronous (real-time) audio-video encounter. The patient (or guardian if applicable) is aware that this is a billable service, which includes applicable co-pays. This Virtual Visit was conducted with patient's (and/or legal guardian's) consent. Verbal consent to proceed has been obtained within the past 12 months. The visit was conducted pursuant to the emergency declaration under the [facility], [facility] authority and the [employer] Act. Patient identification was verified, and a caregiver was present when appropriate. The patient was located in a state where the provider was licensed to provide care. Time spent with Patient: 49 minutes  This is patient's seventh  [facility] appointment. Reason for Consult:    Chief Complaint   Patient presents with    Anxiety     Referring Provider: Jose Melchor DO  [address]    Patient provided informed consent for the behavioral health program. Discussed with patient model of service to include the limits of confidentiality (i.e. abuse reporting, suicide intervention, etc.) and short-term intervention focused approach. Pt indicated understanding. Feedback given to PCP.     Verified the following information:  Patient's identification: Yes  Patient location: [address] [place] [address]   Patient's call back number: [phone]   Patient's emergency contact's name and number, as well as permission to contact them if needed: Extended Emergency Contact Information  Primary Emergency Contact: Rina DILL  Address: [address] [place] [address] Phone: [phone]  Work Phone: [phone]  Relation: Spouse     Provider location: [place], [place] S:  Last appointment 2/17/22    Pt reports increase in anxiety recently. Pt states they sold her family [address], and she and her  had decided to [place] a new [place] house closer to their home. They put an offer on a house that needs a lot of work and renovations. Pt states that she had a lot of physical anxiety, and decided against purchasing the home. Pt states that anxiety initially was reduced, but it has now returned as she worries that she made the wrong decision. Pt is blaming herself, and feels that she has let her  and her son down, as they had wanted the home. Pt has been communicating with her , and he has been supportive, but she continues to struggle. Pt reports that anxiety has been causing some sleep difficulties as well, as she wakes up with racing thoughts and worry. Pt is also having concerns that she is not living her life. Pt reports having money insecurities, despite being financial secure.      O:  MSE:    Appearance: good hygiene   Attitude: cooperative and friendly  Consciousness: alert  Orientation: oriented to person, place, time, general circumstance  Memory    recent and remote memory intact  Attention/Concentration    intact  Psychomotor Activity:normal  Eye Contact: normal  Speech: normal rate and volume, well-articulated  Mood    Anxious  Affect    anxiety  Perception: within normal limits  Thought Content: within normal limits  Thought Process: logical, coherent and goal-directed  Associations    logical connections  Insight: good  Judgment    Intact  Appetite normal  Sleep disturbance Yes  Fatigue No  Loss of pleasure No  Impulsive behavior No  Morbid Ideation: no   Suicide Assessment: no suicidal ideation, plan, or intent  Homicidal Ideation: no    History:    Medications:   Current Outpatient Medications   Medication Sig Dispense Refill    hydroCHLOROthiazide (MICROZIDE) 12.5 MG capsule TAKE ONE CAPSULE BY MOUTH EVERY MORNING 90 capsule 3    denosumab (PROLIA) 60 MG/ML SOSY SC injection Inject 1 mL into the skin once for 1 dose 1 mL 0    Estradiol (VAGIFEM) 10 MCG TABS vaginal tablet Place 1 tablet vaginally Twice a Week       Multiple Vitamins-Minerals (THERAPEUTIC MULTIVITAMIN-MINERALS) tablet Take 1 tablet by mouth daily. Vit D 1000 units       No current facility-administered medications for this visit. 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 She has never used smokeless tobacco.  ETOH:   reports current alcohol use. Family History:   Family History   Problem Relation Age of Onset    High Blood Pressure Other          A:  Patient endorses stable mood. Denies SI/HI, with good insight and motivation. Diagnosis:    1.  Anxiety          Past Diagnosis:        Diagnosis Date    [facility]    COVID 02/2022    Osteoporosis     Plantar fasciitis          Plan:  Patient interventions:  Trained in strategies for increasing balanced thinking  Supportive techniques  Provided pt book recommendation  Promoted hope using strengths-based approach  Provided podcast recommendation  Used open-ended questions and reflection to increase awareness around values  Discussed cognitive dissonance and it's effect on anxiety    Patient Behavioral Change Plan:   See Patient Instructions

## 2022-08-12 ENCOUNTER — TELEMEDICINE (OUTPATIENT)
Dept: PSYCHOLOGY | Age: 67
End: 2022-08-12
Payer: MEDICARE

## 2022-08-12 DIAGNOSIS — F41.9 ANXIETY: Primary | ICD-10-CM

## 2022-08-12 PROCEDURE — 1123F ACP DISCUSS/DSCN MKR DOCD: CPT | Performed by: SOCIAL WORKER

## 2022-08-12 PROCEDURE — 90834 PSYTX W PT 45 MINUTES: CPT | Performed by: SOCIAL WORKER

## 2022-09-01 NOTE — PROGRESS NOTES
1000 units       No current facility-administered medications for this visit. 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 She has never used smokeless tobacco.  ETOH:   reports current alcohol use. Family History:   Family History   Problem Relation Age of Onset    High Blood Pressure Other          A:  Patient endorses stable mood. Denies SI/HI, with good insight and motivation. Diagnosis:    1.  Anxiety          Past Diagnosis:        Diagnosis Date    [facility]    COVID 02/2022    Osteoporosis     Plantar fasciitis          Plan:  Patient interventions:  Trained in strategies for increasing balanced thinking  Trained in improving communication skills  Provided Psychoeducation re: mindfulness, DBT wise mind  Supportive techniques  Promoted hope using strengths-based approach  Used open-ended questions and reflection to increase awareness    Patient Behavioral Change Plan:   See Patient Instructions

## 2022-09-02 ENCOUNTER — TELEMEDICINE (OUTPATIENT)
Dept: PSYCHOLOGY | Age: 67
End: 2022-09-02
Payer: MEDICARE

## 2022-09-02 DIAGNOSIS — F41.9 ANXIETY: Primary | ICD-10-CM

## 2022-09-02 PROCEDURE — 1123F ACP DISCUSS/DSCN MKR DOCD: CPT | Performed by: SOCIAL WORKER

## 2022-09-02 PROCEDURE — 90834 PSYTX W PT 45 MINUTES: CPT | Performed by: SOCIAL WORKER

## 2022-09-02 NOTE — PATIENT INSTRUCTIONS
Talk with your  about emotions related to the lake house. Create a pros/cons list of the lake house. Review handout on mindfulness. MINDFULNESS    Mindfulness means paying attention in a particular way: on purpose, in the present moment, and non-judgmentally. Jordon Saini    \"Mindfulness is the basic human ability to be fully present, aware of where we are and what were doing, and not overly reactive or overwhelmed by whats going on around us. \"   -Mindful [place]    Paying attention on purpose  Mindfulness involves paying attention on purpose. Mindfulness involves a conscious direction of our awareness. This can mean purposefully directing our attention to the breath, or a particular emotion, or an activity as simple as eating. Doing so allows us to actively shape the mind. Paying attention in the present moment  Left to itself, the mind wanders through all kinds of thoughts -- including thoughts expressing anger, craving, depression, self-pity, and anxiety. As we indulge in these kinds of thoughts, we reinforce those emotions and cause ourselves to suffer. These thoughts usually center around the past or future. But the past no longer exists. The future is just a fantasy until it happens. The one moment we actually can experience -- the present moment -- is the one we seem most to avoid. By purposefully directing our awareness away from thoughts about the past or future and instead towards the [address] - our present moment experience - we decrease the effect of these thoughts on our lives. Paying attention non-judgmentally  Mindfulness is an emotionally non-reactive state. We don't  that this experience is good and that one is bad. Or, if we do make those judgments, we dont get upset in reaction to our experience. We simply notice it arising, observe it mindfully, and allow it to pass through us.  When practicing mindfulness, we may be aware that certain experiences are

## 2022-09-06 RX ORDER — HYDROCHLOROTHIAZIDE 12.5 MG/1
CAPSULE, GELATIN COATED ORAL
Qty: 90 CAPSULE | Refills: 1 | Status: SHIPPED | OUTPATIENT
Start: 2022-09-06

## 2022-09-06 NOTE — TELEPHONE ENCOUNTER
Medication:   Requested Prescriptions     Pending Prescriptions Disp Refills    hydroCHLOROthiazide (MICROZIDE) 12.5 MG capsule [Pharmacy Med Name: hydroCHLOROthiazide 12.5 MG CAPSULE] 90 capsule 3     Sig: TAKE ONE CAPSULE BY MOUTH EVERY MORNING   Continue HCTZ 12.5 mg/d, per office note of 1/11/22    Last Filled:      Patient Phone Number: [phone] (home)     Last appt: 11/30/2020   Next appt: 5/30/23    Last Labs DM: No results found for: LABA1C  Last Lipid:   Lab Results   Component Value Date/Time    CHOL 229 10/14/2021 09:42 AM    TRIG 87 10/14/2021 09:42 AM    HDL 82 10/14/2021 09:42 AM    HDL 78 06/05/2012 10:37 AM    LDLCALC 130 10/14/2021 09:42 AM     Last PSA: No results found for: PSA  Last Thyroid:   Lab Results   Component Value Date/Time    TSH 1.79 06/05/2012 10:37 AM

## 2022-09-21 NOTE — PROGRESS NOTES
Behavioral Health Consultation- Follow UP  PAUL Polo  9/23/2022   9:02 AM      Marissa Davis, was evaluated through a synchronous (real-time) audio-video encounter. The patient (or guardian if applicable) is aware that this is a billable service, which includes applicable co-pays. This Virtual Visit was conducted with patient's (and/or legal guardian's) consent. Verbal consent to proceed has been obtained within the past 12 months. The visit was conducted pursuant to the emergency declaration under the [facility], [facility] authority and the [employer] Act. Patient identification was verified, and a caregiver was present when appropriate. The patient was located in a state where the provider was licensed to provide care. Time spent with Patient: 30 minutes  This is patient's ninth  [facility] appointment. Reason for Consult:    Chief Complaint   Patient presents with    Anxiety       Referring Provider: Mauricia Apley, DO  [address] [facility]    Patient provided informed consent for the behavioral health program. Discussed with patient model of service to include the limits of confidentiality (i.e. abuse reporting, suicide intervention, etc.) and short-term intervention focused approach. Pt indicated understanding. Feedback given to PCP.     Verified the following information:  Patient's identification: Yes  Patient location:  [facility] [place], [place]  Patient's call back number: [phone]   Patient's emergency contact's name and number, as well as permission to contact them if needed: Extended Emergency Contact Information  Primary Emergency Contact: Adam Parr  Address: [address] [place] [address] Phone: [phone]  Work Phone: [phone]  Relation: Spouse     Provider location: [place], [place] S:  Last appointment 9/2/22    Pt states that rumination serves a purpose for her at times, which may propel anxiety. Additionally, she has had the mindset of \"if I get through this, everything else will be better. \"  Pt has been practicing mindfulness and being non-judgmental in her experiences. Pt states that she sometimes she is over-accountability for decisions. Pt states that many of her decisions are made while in emotion mind, which makes the decision feel bad, despite knowing it was the right decision for her. Pt states that her sister's family will be coming to visit [place], and have asked to stay with Pt. Pt is feeling overwhelmed, and doesn't necessarily want them to stay with her, however agreed to the visit while setting boundaries around the visit. Pt is feeling some guilt around this. Discussed triggers and relationship patterns and how it can affect decision making.      O:  MSE:    Appearance: good hygiene   Attitude: cooperative and friendly  Consciousness: alert  Orientation: oriented to person, place, time, general circumstance  Memory    recent and remote memory intact  Attention/Concentration    intact  Psychomotor Activity:normal  Eye Contact: normal  Speech: normal rate and volume, well-articulated  Mood    Anxious  Affect    anxiety  Perception: within normal limits  Thought Content: within normal limits  Thought Process: logical, coherent and goal-directed  Associations    logical connections  Insight: good  Judgment    Intact  Appetite normal  Sleep disturbance Yes  Fatigue No  Loss of pleasure No  Impulsive behavior No  Morbid Ideation: no   Suicide Assessment: no suicidal ideation, plan, or intent  Homicidal Ideation: no    History:    Medications:   Current Outpatient Medications   Medication Sig Dispense Refill    hydroCHLOROthiazide (MICROZIDE) 12.5 MG capsule TAKE ONE CAPSULE BY MOUTH EVERY MORNING 90 capsule 1    denosumab (PROLIA) 60 MG/ML SOSY SC injection Inject 1 mL into the skin once for 1 dose 1 mL 0    Estradiol (VAGIFEM) 10 MCG TABS vaginal tablet Place 1 tablet vaginally Twice a Week       Multiple Vitamins-Minerals (THERAPEUTIC MULTIVITAMIN-MINERALS) tablet Take 1 tablet by mouth daily. Vit D 1000 units       No current facility-administered medications for this visit. 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 She has never used smokeless tobacco.  ETOH:   reports current alcohol use. Family History:   Family History   Problem Relation Age of Onset    High Blood Pressure Other          A:  Patient endorses stable mood. Denies SI/HI, with good insight and motivation. Diagnosis:    1.  Anxiety          [facility] Diagnosis:        [facility]osis Date    [facility]    [facility]    COVID 02/2022    Osteoporosis     Plantar fasciitis          Plan:  Patient interventions:  Emphasized self-care as important for managing overall health  Trained in strategies for increasing balanced thinking  Discussed boundary setting in relationships  Supportive techniques  Promoted hope using strengths-based approach    Patient Behavioral Change Plan:   See Patient Instructions

## 2022-09-23 ENCOUNTER — TELEMEDICINE (OUTPATIENT)
Dept: PSYCHOLOGY | Age: 67
End: 2022-09-23
Payer: MEDICARE

## 2022-09-23 DIAGNOSIS — F41.9 ANXIETY: Primary | ICD-10-CM

## 2022-09-23 PROCEDURE — 1123F ACP DISCUSS/DSCN MKR DOCD: CPT | Performed by: SOCIAL WORKER

## 2022-09-23 PROCEDURE — 90832 PSYTX W PT 30 MINUTES: CPT | Performed by: SOCIAL WORKER

## 2022-09-23 NOTE — PATIENT INSTRUCTIONS
Continue to set boundaries with your sister around the upcoming visit. Engage in daily self-care/relaxation techniques.

## 2022-10-21 ENCOUNTER — OFFICE VISIT (OUTPATIENT)
Dept: FAMILY MEDICINE CLINIC | Age: 67
End: 2022-10-21
Payer: MEDICARE

## 2022-10-21 VITALS
HEART RATE: 74 BPM | DIASTOLIC BLOOD PRESSURE: 74 MMHG | HEIGHT: 61 IN | WEIGHT: 119 LBS | TEMPERATURE: 98.2 F | BODY MASS INDEX: 22.47 KG/M2 | OXYGEN SATURATION: 98 % | SYSTOLIC BLOOD PRESSURE: 136 MMHG

## 2022-10-21 DIAGNOSIS — M81.8 OTHER OSTEOPOROSIS WITHOUT CURRENT PATHOLOGICAL FRACTURE: ICD-10-CM

## 2022-10-21 DIAGNOSIS — Z00.00 PREVENTATIVE HEALTH CARE: Primary | ICD-10-CM

## 2022-10-21 DIAGNOSIS — J01.80 ACUTE NON-RECURRENT SINUSITIS OF OTHER SINUS: ICD-10-CM

## 2022-10-21 LAB
A/G RATIO: 2.2 (ref 1.1–2.2)
ALBUMIN SERPL-MCNC: 4.6 G/DL (ref 3.4–5)
ALP BLD-CCNC: 69 U/L (ref 40–129)
ALT SERPL-CCNC: 27 U/L (ref 10–40)
ANION GAP SERPL CALCULATED.3IONS-SCNC: 11 MMOL/L (ref 3–16)
AST SERPL-CCNC: 21 U/L (ref 15–37)
BASOPHILS ABSOLUTE: 0 K/UL (ref 0–0.2)
BASOPHILS RELATIVE PERCENT: 0.4 %
BILIRUB SERPL-MCNC: 0.5 MG/DL (ref 0–1)
BUN BLDV-MCNC: 14 MG/DL (ref 7–20)
CALCIUM SERPL-MCNC: 9.6 MG/DL (ref 8.3–10.6)
CHLORIDE BLD-SCNC: 103 MMOL/L (ref 99–110)
CO2: 28 MMOL/L (ref 21–32)
CREAT SERPL-MCNC: 0.7 MG/DL (ref 0.6–1.2)
EOSINOPHILS ABSOLUTE: 0.1 K/UL (ref 0–0.6)
EOSINOPHILS RELATIVE PERCENT: 2 %
GFR SERPL CREATININE-BSD FRML MDRD: >60 ML/MIN/{1.73_M2}
GLUCOSE BLD-MCNC: 106 MG/DL (ref 70–99)
HCT VFR BLD CALC: 43 % (ref 36–48)
HEMOGLOBIN: 14.9 G/DL (ref 12–16)
LDL CHOLESTEROL DIRECT: 136 MG/DL
LYMPHOCYTES ABSOLUTE: 1.4 K/UL (ref 1–5.1)
LYMPHOCYTES RELATIVE PERCENT: 25 %
MCH RBC QN AUTO: 31.3 PG (ref 26–34)
MCHC RBC AUTO-ENTMCNC: 34.5 G/DL (ref 31–36)
MCV RBC AUTO: 90.8 FL (ref 80–100)
MONOCYTES ABSOLUTE: 0.5 K/UL (ref 0–1.3)
MONOCYTES RELATIVE PERCENT: 9.2 %
NEUTROPHILS ABSOLUTE: 3.6 K/UL (ref 1.7–7.7)
NEUTROPHILS RELATIVE PERCENT: 63.4 %
PDW BLD-RTO: 12.8 % (ref 12.4–15.4)
PLATELET # BLD: 175 K/UL (ref 135–450)
PMV BLD AUTO: 8.4 FL (ref 5–10.5)
POTASSIUM SERPL-SCNC: 4.5 MMOL/L (ref 3.5–5.1)
RBC # BLD: 4.74 M/UL (ref 4–5.2)
SODIUM BLD-SCNC: 142 MMOL/L (ref 136–145)
TOTAL PROTEIN: 6.7 G/DL (ref 6.4–8.2)
WBC # BLD: 5.7 K/UL (ref 4–11)

## 2022-10-21 PROCEDURE — G0008 ADMIN INFLUENZA VIRUS VAC: HCPCS | Performed by: FAMILY MEDICINE

## 2022-10-21 PROCEDURE — 99214 OFFICE O/P EST MOD 30 MIN: CPT | Performed by: FAMILY MEDICINE

## 2022-10-21 PROCEDURE — 36415 COLL VENOUS BLD VENIPUNCTURE: CPT | Performed by: FAMILY MEDICINE

## 2022-10-21 PROCEDURE — 1123F ACP DISCUSS/DSCN MKR DOCD: CPT | Performed by: FAMILY MEDICINE

## 2022-10-21 PROCEDURE — 90694 VACC AIIV4 NO PRSRV 0.5ML IM: CPT | Performed by: FAMILY MEDICINE

## 2022-10-21 RX ORDER — DOXYCYCLINE HYCLATE 100 MG
100 TABLET ORAL 2 TIMES DAILY
Qty: 14 TABLET | Refills: 0 | Status: SHIPPED | OUTPATIENT
Start: 2022-10-21 | End: 2022-10-28

## 2022-10-21 RX ORDER — GREEN TEA/HOODIA GORDONII 315-12.5MG
1 CAPSULE ORAL DAILY
Qty: 30 TABLET | Refills: 0 | Status: SHIPPED | OUTPATIENT
Start: 2022-10-21 | End: 2022-11-20

## 2022-10-21 SDOH — ECONOMIC STABILITY: FOOD INSECURITY: WITHIN THE PAST 12 MONTHS, THE FOOD YOU BOUGHT JUST DIDN'T LAST AND YOU DIDN'T HAVE MONEY TO GET MORE.: NEVER TRUE

## 2022-10-21 SDOH — ECONOMIC STABILITY: FOOD INSECURITY: WITHIN THE PAST 12 MONTHS, YOU WORRIED THAT YOUR FOOD WOULD RUN OUT BEFORE YOU GOT MONEY TO BUY MORE.: NEVER TRUE

## 2022-10-21 ASSESSMENT — SOCIAL DETERMINANTS OF HEALTH (SDOH): HOW HARD IS IT FOR YOU TO PAY FOR THE VERY BASICS LIKE FOOD, HOUSING, MEDICAL CARE, AND HEATING?: NOT HARD AT ALL

## 2022-10-21 NOTE — PROGRESS NOTES
A/P:    Diagnosis Orders   1. Preventative health care  LDL Cholesterol, Direct    Comprehensive Metabolic Panel    CBC with Auto Differential      2. Acute non-recurrent sinusitis of other sinus        3. Other osteoporosis without current pathological fracture            Healthy living encouraged, anticipatory guidance provided, flu shot today, she plans to get pneumococcal 20 vaccine in the next few weeks    For her sinusitis, she will continue with supportive care measures, if any worsening, or not continuing to improve by Monday, she we will start doxycycline    For her osteoporosis, she will continue current medication management with specialist follow-up    Follow-up in 1 year or sooner if needed    O:   Vitals:    10/21/22 0924   BP: 136/74   Pulse: 74   Temp: 98.2 °F (36.8 °C)   SpO2: 98%     Gen- NAD, pleasant  HEENT- Eyes without icterus or injection, throat and tms unremarkable  Neck- Supple, no lymphadenopathy appreciated  Lungs- CTAB  Heart- RRR  Abd- Soft, non tender  Ext- No edema  Psych- Appropriate    S: CC-preventative visit  HPI-patient reports for preventative visit. She reports she is doing well overall. She does have nasal congestion, facial pressure. This has been going on for the past week. It seems to be slowly improving. She continues to see Dr. Howard Perez for osteoporosis. She continues to see Ms. Johnson as counselor here in the office. She reports she is doing reasonably well mood wise. She is up-to-date with her mammogram, colon cancer screening.     ROS  Denies fever, chills, night sweats  Denies headaches, sore throat, ear pain  Denies chest pain, dyspnea, palpitations  Denies nausea, vomiting, diarrhea  Denies joint pain  Denies depression  Denies urinary symptoms  Denies rashes    Current Outpatient Medications   Medication Sig Dispense Refill    doxycycline hyclate (VIBRA-TABS) 100 MG tablet Take 1 tablet by mouth 2 times daily for 7 days 14 tablet 0    Probiotic Acidophilus [facility]) TABS Take 1 tablet by mouth daily 30 tablet 0    hydroCHLOROthiazide (MICROZIDE) 12.5 MG capsule TAKE ONE CAPSULE BY MOUTH EVERY MORNING 90 capsule 1    denosumab (PROLIA) 60 MG/ML SOSY SC injection Inject 1 mL into the skin once for 1 dose 1 mL 0    Estradiol (VAGIFEM) 10 MCG TABS vaginal tablet Place 1 tablet vaginally Twice a Week       Multiple Vitamins-Minerals (THERAPEUTIC MULTIVITAMIN-MINERALS) tablet Take 1 tablet by mouth daily. Vit D 1000 units       No current facility-administered medications for this visit.         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

## 2022-11-09 ENCOUNTER — NURSE ONLY (OUTPATIENT)
Dept: ENDOCRINOLOGY | Age: 67
End: 2022-11-09
Payer: MEDICARE

## 2022-11-09 DIAGNOSIS — M81.0 OSTEOPOROSIS, POSTMENOPAUSAL: Primary | Chronic | ICD-10-CM

## 2022-11-09 PROCEDURE — 96372 THER/PROPH/DIAG INJ SC/IM: CPT | Performed by: INTERNAL MEDICINE

## 2022-11-11 ENCOUNTER — NURSE ONLY (OUTPATIENT)
Dept: FAMILY MEDICINE CLINIC | Age: 67
End: 2022-11-11
Payer: MEDICARE

## 2022-11-11 PROCEDURE — G0009 ADMIN PNEUMOCOCCAL VACCINE: HCPCS | Performed by: FAMILY MEDICINE

## 2022-11-11 PROCEDURE — 90677 PCV20 VACCINE IM: CPT | Performed by: FAMILY MEDICINE

## 2023-04-03 RX ORDER — HYDROCHLOROTHIAZIDE 12.5 MG/1
CAPSULE, GELATIN COATED ORAL
Qty: 90 CAPSULE | Refills: 4 | Status: SHIPPED | OUTPATIENT
Start: 2023-04-03

## 2023-05-01 ENCOUNTER — TELEPHONE (OUTPATIENT)
Dept: ENDOCRINOLOGY | Age: 68
End: 2023-05-01

## 2023-05-30 ENCOUNTER — OFFICE VISIT (OUTPATIENT)
Dept: ENDOCRINOLOGY | Age: 68
End: 2023-05-30

## 2023-05-30 ENCOUNTER — PROCEDURE VISIT (OUTPATIENT)
Dept: ENDOCRINOLOGY | Age: 68
End: 2023-05-30

## 2023-05-30 ENCOUNTER — HOSPITAL ENCOUNTER (OUTPATIENT)
Dept: GENERAL RADIOLOGY | Age: 68
Discharge: HOME | End: 2023-05-30
Payer: MEDICARE

## 2023-05-30 VITALS
RESPIRATION RATE: 14 BRPM | BODY MASS INDEX: 21.9 KG/M2 | DIASTOLIC BLOOD PRESSURE: 64 MMHG | HEIGHT: 61 IN | TEMPERATURE: 98 F | HEART RATE: 86 BPM | SYSTOLIC BLOOD PRESSURE: 110 MMHG | WEIGHT: 116 LBS

## 2023-05-30 DIAGNOSIS — M81.0 OSTEOPOROSIS, POSTMENOPAUSAL: Primary | Chronic | ICD-10-CM

## 2023-05-30 DIAGNOSIS — R82.994 HYPERCALCIURIA: ICD-10-CM

## 2023-05-30 DIAGNOSIS — M81.0 OSTEOPOROSIS, POSTMENOPAUSAL: ICD-10-CM

## 2023-05-30 DIAGNOSIS — E55.9 VITAMIN D DEFICIENCY: ICD-10-CM

## 2023-05-30 DIAGNOSIS — M81.0 OSTEOPOROSIS, POSTMENOPAUSAL: Primary | ICD-10-CM

## 2023-05-30 DIAGNOSIS — Z51.81 MEDICATION MONITORING ENCOUNTER: ICD-10-CM

## 2023-05-30 PROCEDURE — 77080 DXA BONE DENSITY AXIAL: CPT

## 2023-05-30 PROCEDURE — 77080 DXA BONE DENSITY AXIAL: CPT | Performed by: INTERNAL MEDICINE

## 2023-05-30 NOTE — PROGRESS NOTES
[facility] ([facility]) Osteoporosis and [address]  [facility] [address] [facility], [address]  Phone [phone]  Fax [phone]    NAME: eParl Chin OF BIRTH: 1955  INITIAL CONSULTATION: 05/10/2011  LAST OFFICE VISIT: 01/22/2022  TODAY'S VISIT: 05/30/2023    Labs @ [facility] 10/2022    PROBLEMS:   Osteoporosis by DXA 11/2010, lowest T-score -2.6 in the spine    Family history of osteoporosis, sister with a hip fracture, mother    Low bone density by DXA 10/2008, lowest T-score -1.8 in the spine    Fractured foot 2012 (slipped)    Natural menopause age 48 (2009)  Hypercalciuria, normal 25-h urine calcium for her is 100-215    330 mg/d10/2011 (she did not want to take HCTZ at that time    294 mg/d 08/2015 on no medication    172 mg/d 01/2016 with HCTZ 12.5 mg/d  Left club foot  Vitamin D, desirable 25-OH D is 30-60 ng/mL    30 ng/mL 08/2010 on 1600 IU/d    18 ng/mL 11/2010 on ergocalciferol 50,000 weekly (08/2010-11/2010)    34 ng/mL 07/2011 on cholecalciferol 1600 IU/d    39 ng/mL 07/2013 on vitamin D 1000 IU/d    45 ng/mL 08/2015 with vitamin D 1000 IU/d    47 ng/mL 10/2020    44 ng/mL 10/2021    CURRENT MANAGEMENT FOR BONE HEALTH:   Calcium: 900 mg/d diet + 500 mg/d with multivitamin = 1400 mg/d    300 mg other, 300 mg milk, 300 mg yogurt  Vitamin D: 1000 IU/d in MVI  Exercise: walks outside 3 x weekly  Pharmacologic therapy:  Prolia 60 mg SQ twice yearly started 07/2014    PREVIOUS MEDICATIONS FOR OSTEOPOROSIS:   Fosamax 35 mg weekly 09/2009-04/2010, stopped by dentist  Alendronate 07/2013-07/2014, changed to Prolia with hopes of better BMD increase    OTHER CURRENT MEDICATIONS (SELECTED): None  OTC MEDICATIONS: None    CHIEF COMPLAINT: Here for f/u visit of osteoporosis and vitamin D deficiency, monitoring treatment. No new related signs or symptoms.     PAST MEDICAL HISTORY, FAMILY HISTORY, SOCIAL HISTORY AND REVIEW OF SYSTEMS:  Relevant changes since last visit (see patient

## 2023-05-30 NOTE — RESULT ENCOUNTER NOTE
MEASURED. SINCE THE LAST DXA, BMD DID NOT CHANGE SIGNIFICANTLY IN THE SPINE OR LEFT HIP BUT IS TRENDING UP IN THE SPINE AND FEMORAL NECK. Consider repeating this study in 1-2 years to assess the patient's progress. _________________________________________________   Mark Clark MD, Director, [facility]) Osteoporosis and [address] [place] [address]

## 2023-07-12 ENCOUNTER — HOSPITAL ENCOUNTER (OUTPATIENT)
Dept: WOMENS IMAGING | Age: 68
Discharge: HOME | End: 2023-07-12
Payer: MEDICARE

## 2023-07-12 DIAGNOSIS — Z12.31 VISIT FOR SCREENING MAMMOGRAM: ICD-10-CM

## 2023-07-12 PROCEDURE — 77063 BREAST TOMOSYNTHESIS BI: CPT

## 2023-07-31 ENCOUNTER — OFFICE VISIT (OUTPATIENT)
Dept: DERMATOLOGY | Age: 68
End: 2023-07-31
Payer: MEDICARE

## 2023-07-31 DIAGNOSIS — L81.4 LENTIGINES: ICD-10-CM

## 2023-07-31 DIAGNOSIS — D22.9 MULTIPLE NEVI: Primary | ICD-10-CM

## 2023-07-31 DIAGNOSIS — D48.5 NEOPLASM OF UNCERTAIN BEHAVIOR OF SKIN: ICD-10-CM

## 2023-07-31 DIAGNOSIS — L30.9 DERMATITIS: ICD-10-CM

## 2023-07-31 DIAGNOSIS — L57.0 AK (ACTINIC KERATOSIS): ICD-10-CM

## 2023-07-31 PROCEDURE — 11102 TANGNTL BX SKIN SINGLE LES: CPT | Performed by: DERMATOLOGY

## 2023-07-31 PROCEDURE — 17000 DESTRUCT PREMALG LESION: CPT | Performed by: DERMATOLOGY

## 2023-07-31 PROCEDURE — 1123F ACP DISCUSS/DSCN MKR DOCD: CPT | Performed by: DERMATOLOGY

## 2023-07-31 PROCEDURE — 11103 TANGNTL BX SKIN EA SEP/ADDL: CPT | Performed by: DERMATOLOGY

## 2023-07-31 PROCEDURE — 99213 OFFICE O/P EST LOW 20 MIN: CPT | Performed by: DERMATOLOGY

## 2023-07-31 NOTE — PROGRESS NOTES
cycles for 1-5 seconds each after consent from patient. Patient educated on risk of blister, hypopigmentation/scar and wound care. Tolerated well. 3. Hx of hand and fingertip dermatitis - irritant/from dryness - overall clear  - cont DSC    4. L upper outer arm - bx 2022 - remains clear  LEFT UPPER OUTER ARM- Irritated junction nevus   COMMENT: Although excised in the planes of sectioning, re-biopsy is recommended if the lesion recurs  - cont monitoring and sun protection    5. L upper arm - r/o ISK  R shin - r/o BCC vs LK  - 2 Shave biopsy performed after verbal consent obtained. Patient educated regarding risk of bleeding, infection, scar and educated on wound care. Skin cleansed with alcohol pad and site anesthetized with lido + epi. Aluminum chloride applied to site for hemostasis. Petrolatum ointment and bandage applied. Specimen bottle labeled with patient information and site and specimen sent to dermpath. Lip - tiny lentigo/freckle    Previously Ed /trx x 2-3 for the chest if desired. May increase with cost incr of laser. F/u 1 year.

## 2023-08-03 LAB — DERMATOLOGY PATHOLOGY REPORT: NORMAL

## 2023-11-03 ENCOUNTER — OFFICE VISIT (OUTPATIENT)
Dept: FAMILY MEDICINE CLINIC | Age: 68
End: 2023-11-03
Payer: MEDICARE

## 2023-11-03 VITALS
OXYGEN SATURATION: 99 % | BODY MASS INDEX: 23.11 KG/M2 | TEMPERATURE: 98.8 F | HEIGHT: 61 IN | SYSTOLIC BLOOD PRESSURE: 100 MMHG | HEART RATE: 68 BPM | DIASTOLIC BLOOD PRESSURE: 62 MMHG | WEIGHT: 122.4 LBS

## 2023-11-03 DIAGNOSIS — M81.8 OTHER OSTEOPOROSIS WITHOUT CURRENT PATHOLOGICAL FRACTURE: ICD-10-CM

## 2023-11-03 DIAGNOSIS — E78.5 MILD HYPERLIPIDEMIA: ICD-10-CM

## 2023-11-03 DIAGNOSIS — Z00.00 MEDICARE ANNUAL WELLNESS VISIT, SUBSEQUENT: Primary | ICD-10-CM

## 2023-11-03 LAB
ALBUMIN SERPL-MCNC: 4.7 G/DL (ref 3.4–5)
ALBUMIN/GLOB SERPL: 2.8 {RATIO} (ref 1.1–2.2)
ALP SERPL-CCNC: 50 U/L (ref 40–129)
ALT SERPL-CCNC: 16 U/L (ref 10–40)
ANION GAP SERPL CALCULATED.3IONS-SCNC: 10 MMOL/L (ref 3–16)
AST SERPL-CCNC: 21 U/L (ref 15–37)
BILIRUB SERPL-MCNC: 0.9 MG/DL (ref 0–1)
BUN SERPL-MCNC: 16 MG/DL (ref 7–20)
CALCIUM SERPL-MCNC: 9.4 MG/DL (ref 8.3–10.6)
CHLORIDE SERPL-SCNC: 101 MMOL/L (ref 99–110)
CHOLEST SERPL-MCNC: 234 MG/DL (ref 0–199)
CO2 SERPL-SCNC: 29 MMOL/L (ref 21–32)
CREAT SERPL-MCNC: 0.7 MG/DL (ref 0.6–1.2)
GFR SERPLBLD CREATININE-BSD FMLA CKD-EPI: >60 ML/MIN/{1.73_M2}
GLUCOSE SERPL-MCNC: 92 MG/DL (ref 70–99)
HDLC SERPL-MCNC: 77 MG/DL (ref 40–60)
LDLC SERPL CALC-MCNC: 141 MG/DL
POTASSIUM SERPL-SCNC: 4.3 MMOL/L (ref 3.5–5.1)
PROT SERPL-MCNC: 6.4 G/DL (ref 6.4–8.2)
SODIUM SERPL-SCNC: 140 MMOL/L (ref 136–145)
TRIGL SERPL-MCNC: 80 MG/DL (ref 0–150)
TSH SERPL DL<=0.005 MIU/L-ACNC: 2.2 UIU/ML (ref 0.27–4.2)
VLDLC SERPL CALC-MCNC: 16 MG/DL

## 2023-11-03 PROCEDURE — 36415 COLL VENOUS BLD VENIPUNCTURE: CPT | Performed by: FAMILY MEDICINE

## 2023-11-03 PROCEDURE — 1123F ACP DISCUSS/DSCN MKR DOCD: CPT | Performed by: FAMILY MEDICINE

## 2023-11-03 PROCEDURE — G0008 ADMIN INFLUENZA VIRUS VAC: HCPCS | Performed by: FAMILY MEDICINE

## 2023-11-03 PROCEDURE — 90694 VACC AIIV4 NO PRSRV 0.5ML IM: CPT | Performed by: FAMILY MEDICINE

## 2023-11-03 PROCEDURE — G0439 PPPS, SUBSEQ VISIT: HCPCS | Performed by: FAMILY MEDICINE

## 2023-11-03 SDOH — ECONOMIC STABILITY: FOOD INSECURITY: WITHIN THE PAST 12 MONTHS, YOU WORRIED THAT YOUR FOOD WOULD RUN OUT BEFORE YOU GOT MONEY TO BUY MORE.: NEVER TRUE

## 2023-11-03 SDOH — ECONOMIC STABILITY: INCOME INSECURITY: HOW HARD IS IT FOR YOU TO PAY FOR THE VERY BASICS LIKE FOOD, HOUSING, MEDICAL CARE, AND HEATING?: NOT HARD AT ALL

## 2023-11-03 SDOH — ECONOMIC STABILITY: FOOD INSECURITY: WITHIN THE PAST 12 MONTHS, THE FOOD YOU BOUGHT JUST DIDN'T LAST AND YOU DIDN'T HAVE MONEY TO GET MORE.: NEVER TRUE

## 2023-11-03 SDOH — ECONOMIC STABILITY: HOUSING INSECURITY
IN THE LAST 12 MONTHS, WAS THERE A TIME WHEN YOU DID NOT HAVE A STEADY PLACE TO SLEEP OR SLEPT IN A SHELTER (INCLUDING NOW)?: NO

## 2023-11-03 ASSESSMENT — PATIENT HEALTH QUESTIONNAIRE - PHQ9
SUM OF ALL RESPONSES TO PHQ9 QUESTIONS 1 & 2: 0
SUM OF ALL RESPONSES TO PHQ QUESTIONS 1-9: 0
SUM OF ALL RESPONSES TO PHQ QUESTIONS 1-9: 0
1. LITTLE INTEREST OR PLEASURE IN DOING THINGS: 0
SUM OF ALL RESPONSES TO PHQ QUESTIONS 1-9: 0
2. FEELING DOWN, DEPRESSED OR HOPELESS: 0
SUM OF ALL RESPONSES TO PHQ QUESTIONS 1-9: 0

## 2023-11-03 ASSESSMENT — LIFESTYLE VARIABLES
HOW MANY STANDARD DRINKS CONTAINING ALCOHOL DO YOU HAVE ON A TYPICAL DAY: 1 OR 2
HOW OFTEN DO YOU HAVE A DRINK CONTAINING ALCOHOL: 2-4 TIMES A MONTH

## 2023-11-21 ENCOUNTER — OFFICE VISIT (OUTPATIENT)
Dept: FAMILY MEDICINE CLINIC | Age: 68
End: 2023-11-21
Payer: MEDICARE

## 2023-11-21 VITALS
OXYGEN SATURATION: 97 % | SYSTOLIC BLOOD PRESSURE: 102 MMHG | HEIGHT: 61 IN | HEART RATE: 68 BPM | WEIGHT: 122 LBS | TEMPERATURE: 97.3 F | DIASTOLIC BLOOD PRESSURE: 62 MMHG | BODY MASS INDEX: 23.03 KG/M2

## 2023-11-21 DIAGNOSIS — R42 VERTIGO: Primary | ICD-10-CM

## 2023-11-21 PROCEDURE — 1123F ACP DISCUSS/DSCN MKR DOCD: CPT | Performed by: NURSE PRACTITIONER

## 2023-11-21 PROCEDURE — 99213 OFFICE O/P EST LOW 20 MIN: CPT | Performed by: NURSE PRACTITIONER

## 2023-11-21 RX ORDER — MECLIZINE HYDROCHLORIDE 25 MG/1
25 TABLET ORAL 3 TIMES DAILY PRN
Qty: 30 TABLET | Refills: 0 | Status: SHIPPED | OUTPATIENT
Start: 2023-11-21 | End: 2023-12-01

## 2023-11-21 RX ORDER — ONDANSETRON 4 MG/1
4 TABLET, FILM COATED ORAL 3 TIMES DAILY PRN
Qty: 15 TABLET | Refills: 0 | Status: SHIPPED | OUTPATIENT
Start: 2023-11-21

## 2023-11-21 NOTE — PROGRESS NOTES
Rj Dietrich (:  1955) is a 79 y.o. female,Established patient, here for evaluation of the following chief complaint(s):  Dizziness (Yesterday while sitting felt like room was spinning. DX with Vertigo in 20's, also had some nausea this morning with dizziness)         ASSESSMENT/PLAN:  1. Vertigo  -     meclizine (ANTIVERT) 25 MG tablet; Take 1 tablet by mouth 3 times daily as needed for Dizziness, Disp-30 tablet, R-0Normal  -     ondansetron (ZOFRAN) 4 MG tablet; Take 1 tablet by mouth 3 times daily as needed for Nausea or Vomiting, Disp-15 tablet, R-0Normal  - educated on using the eply maneuver to help the dizziness        Return if symptoms worsen or fail to improve. Subjective   SUBJECTIVE/OBJECTIVE:  HPI    Patient presents today for dizziness for about a day. States she feels the room spinning when she lies down. States she had to pull over from driving, the dizziness was so severe. Denies any headaches, numbness, blurred vision, sob, chest pain. Denies any otc medication to help. Review of Systems   See HPI    Objective   Physical Exam  Vitals and nursing note reviewed. Constitutional:       Appearance: Normal appearance. Musculoskeletal:         General: Normal range of motion. Skin:     General: Skin is warm and dry. Neurological:      General: No focal deficit present. Mental Status: She is alert and oriented to person, place, and time. Cranial Nerves: No cranial nerve deficit. Sensory: No sensory deficit. Motor: No weakness. Coordination: Coordination normal.      Gait: Gait normal.      Deep Tendon Reflexes: Reflexes normal.   Psychiatric:         Mood and Affect: Mood normal.            On this date 2023 I have spent 25 minutes reviewing previous notes, test results and face to face with the patient discussing the diagnosis and importance of compliance with the treatment plan as well as documenting on the day of the visit.       An electronic

## 2023-12-08 ENCOUNTER — NURSE ONLY (OUTPATIENT)
Dept: ENDOCRINOLOGY | Age: 68
End: 2023-12-08
Payer: MEDICARE

## 2023-12-08 DIAGNOSIS — M81.0 OSTEOPOROSIS, POSTMENOPAUSAL: Primary | ICD-10-CM

## 2023-12-08 PROCEDURE — 96372 THER/PROPH/DIAG INJ SC/IM: CPT | Performed by: INTERNAL MEDICINE

## 2024-06-07 ENCOUNTER — TELEPHONE (OUTPATIENT)
Dept: ENDOCRINOLOGY | Age: 69
End: 2024-06-07

## 2024-06-11 ENCOUNTER — TELEPHONE (OUTPATIENT)
Dept: ENDOCRINOLOGY | Age: 69
End: 2024-06-11

## 2024-06-11 NOTE — TELEPHONE ENCOUNTER
Submitted PA for Prolia  Via [facility] Key: [id number] STATUS: PENDING.    Follow up done daily; if no decision with in three days we will refax.  If another three days goes by with no decision will call the insurance for status.

## 2024-06-12 NOTE — TELEPHONE ENCOUNTER
Prolia approved    Approval scanned in media    If this requires a response please respond to the pool ( P MHCX PSC MEDICATION PRE-AUTH).      Thank you please advise patient.

## 2024-06-13 ENCOUNTER — HOSPITAL ENCOUNTER (OUTPATIENT)
Dept: GENERAL RADIOLOGY | Age: 69
Discharge: HOME | End: 2024-06-13
Payer: MEDICARE

## 2024-06-13 ENCOUNTER — OFFICE VISIT (OUTPATIENT)
Dept: ENDOCRINOLOGY | Age: 69
End: 2024-06-13
Payer: MEDICARE

## 2024-06-13 VITALS — HEIGHT: 61 IN | BODY MASS INDEX: 22.84 KG/M2 | WEIGHT: 121 LBS

## 2024-06-13 DIAGNOSIS — Z51.81 MEDICATION MONITORING ENCOUNTER: ICD-10-CM

## 2024-06-13 DIAGNOSIS — M81.0 OSTEOPOROSIS, POSTMENOPAUSAL: ICD-10-CM

## 2024-06-13 DIAGNOSIS — R82.994 HYPERCALCIURIA: ICD-10-CM

## 2024-06-13 DIAGNOSIS — E55.9 VITAMIN D DEFICIENCY: ICD-10-CM

## 2024-06-13 DIAGNOSIS — M81.0 OSTEOPOROSIS, POSTMENOPAUSAL: Primary | ICD-10-CM

## 2024-06-13 PROCEDURE — 1123F ACP DISCUSS/DSCN MKR DOCD: CPT | Performed by: INTERNAL MEDICINE

## 2024-06-13 PROCEDURE — 96372 THER/PROPH/DIAG INJ SC/IM: CPT | Performed by: INTERNAL MEDICINE

## 2024-06-13 PROCEDURE — 77080 DXA BONE DENSITY AXIAL: CPT

## 2024-06-13 PROCEDURE — 99214 OFFICE O/P EST MOD 30 MIN: CPT | Performed by: INTERNAL MEDICINE

## 2024-06-13 PROCEDURE — 77080 DXA BONE DENSITY AXIAL: CPT | Performed by: INTERNAL MEDICINE

## 2024-06-13 RX ORDER — HYDROCHLOROTHIAZIDE 12.5 MG/1
12.5 CAPSULE, GELATIN COATED ORAL EVERY MORNING
Qty: 90 CAPSULE | Refills: 4 | Status: SHIPPED | OUTPATIENT
Start: 2024-06-13

## 2024-06-13 NOTE — PROGRESS NOTES
[facility]  [address] [place], [place] [id number]  Phone [phone]  Fax [phone]    NAME: JARRELL ANAND  YOB: 1955  INITIAL CONSULTATION: 05/10/2011  LAST OFFICE VISIT: 05/30/2023  TODAY'S VISIT: 06/13/2024    Labs @ [facility] 11/2023    PROBLEMS:   Osteoporosis by DXA 11/2010, lowest T-score -2.6 in the spine    Family history of osteoporosis, sister with a hip fracture, mother    Low bone density by DXA 10/2008, lowest T-score -1.8 in the spine    Fractured foot 2012 (slipped)    Natural menopause age 53 (2009)  Hypercalciuria, normal 25-h urine calcium for her is 100-215    330 mg/d10/2011 (she did not want to take HCTZ at that time    294 mg/d 08/2015 on no medication    172 mg/d 01/2016 with HCTZ 12.5 mg/d  Left club foot  Vitamin D, desirable 25-OH D is 30-60 ng/mL    30 ng/mL 08/2010 on 1600 IU/d    18 ng/mL 11/2010 on ergocalciferol 50,000 weekly (08/2010-11/2010)    34 ng/mL 07/2011 on cholecalciferol 1600 IU/d    39 ng/mL 07/2013 on vitamin D 1000 IU/d    45 ng/mL 08/2015 with vitamin D 1000 IU/d    47 ng/mL 10/2020    44 ng/mL 10/2021    CURRENT MANAGEMENT FOR BONE HEALTH:   Calcium: 900 mg/d diet + 500 mg/d with multivitamin = 1400 mg/d    300 mg other, 300 mg milk, 300 mg yogurt  Vitamin D: 1000 IU/d in MVI  Exercise: walks outside 3 x weekly  Pharmacologic therapy:  Prolia 60 mg SQ twice yearly started 07/2014    PREVIOUS MEDICATIONS FOR OSTEOPOROSIS:   Fosamax 35 mg weekly 09/2009-04/2010, stopped by dentist  Alendronate 07/2013-07/2014, changed to Prolia with hopes of better BMD increase    OTHER CURRENT MEDICATIONS (SELECTED): None  OTC MEDICATIONS: None    CHIEF COMPLAINT: Here for f/u visit of osteoporosis and vitamin D deficiency, monitoring treatment.  No new related signs or symptoms.    PAST MEDICAL HISTORY, FAMILY HISTORY, SOCIAL HISTORY:  Relevant changes since last visit (see patient questionnaire of today's

## 2024-07-16 ENCOUNTER — HOSPITAL ENCOUNTER (OUTPATIENT)
Dept: WOMENS IMAGING | Age: 69
Discharge: HOME | End: 2024-07-16
Payer: MEDICARE

## 2024-07-16 VITALS — BODY MASS INDEX: 22.28 KG/M2 | WEIGHT: 118 LBS | HEIGHT: 61 IN

## 2024-07-16 DIAGNOSIS — Z12.31 VISIT FOR SCREENING MAMMOGRAM: ICD-10-CM

## 2024-07-16 PROCEDURE — 77063 BREAST TOMOSYNTHESIS BI: CPT

## 2024-08-20 ENCOUNTER — OFFICE VISIT (OUTPATIENT)
Dept: DERMATOLOGY | Age: 69
End: 2024-08-20
Payer: MEDICARE

## 2024-08-20 DIAGNOSIS — L57.0 AK (ACTINIC KERATOSIS): ICD-10-CM

## 2024-08-20 DIAGNOSIS — D22.9 MULTIPLE NEVI: Primary | ICD-10-CM

## 2024-08-20 DIAGNOSIS — D48.5 NEOPLASM OF UNCERTAIN BEHAVIOR OF SKIN: ICD-10-CM

## 2024-08-20 DIAGNOSIS — L81.4 LENTIGINES: ICD-10-CM

## 2024-08-20 PROCEDURE — 1123F ACP DISCUSS/DSCN MKR DOCD: CPT | Performed by: DERMATOLOGY

## 2024-08-20 PROCEDURE — 99213 OFFICE O/P EST LOW 20 MIN: CPT | Performed by: DERMATOLOGY

## 2024-08-20 NOTE — PATIENT INSTRUCTIONS

## 2024-08-20 NOTE — PROGRESS NOTES
[facility] Dermat[facility]  Brandy Busch MD  [phone]      Bandar Parr  1955    68 y.o. female     Date of Visit: 8/20/2024    Chief Complaint: moles/lesions  Chief Complaint   Patient presents with    Skin Exam     Last seen: 7-2023  *retired in 2020  *saw Dr. Ruvalcaba in the past    *sister diagnosed with breast cancer in 2019    History of Present Illness:    1. Here for evaluation of multiple asx pigmented lesions on the trunk and extremities, present for many years; no change in size/shape/color of any lesions; no bleeding lesions.    2. Hx AK.  1no new concerns today.     3. F/u dry hands, fissuring fingertips.  Previously worse with hand  at work but now retired.  Has tried moisturizer.  No significant flares recently.    4. S/p bx of lesion on the L upper arm 2022 - no probs since.  LEFT UPPER OUTER ARM- Irritated junction nevus   COMMENT: Although excised in the planes of sectioning, re-biopsy is recommended if the lesion recurs    5. She has a subtle concerning pigmented lesion on the L chin.  Unsure of duration.  Asx.     Previously addressed:   thickening of the L 2nd toenail over the past year.  Walking more.  Asx.     No personal hx of skin cancer.  She wears sunscreen regularly but is in the sun a lot - has a pool and a boat.  Mother with hx of NMSC.  Hx of many sunburns as a child/teen.  Used tanning beds a few times in the past but none recently.    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  She has 2 granddaughters + grandson - Brandy and Qi - at [facility] in [id number] and grandson born early 2022    Review of Systems:  Gen: Feels well, good sense of health.  Skin: No changing moles or lesions.    Past Medical History, Family History, Surgical History, Medications and Allergies reviewed.    Past Medical History:   Diagnosis Date    Arthritis     Club Foot 1956    left    COVID 02/2022    Hyperlipidemia     Osteoporosis     Plantar fasciitis

## 2024-08-26 LAB — DERMATOLOGY PATHOLOGY REPORT: NORMAL

## 2024-10-17 SDOH — HEALTH STABILITY: PHYSICAL HEALTH: ON AVERAGE, HOW MANY DAYS PER WEEK DO YOU ENGAGE IN MODERATE TO STRENUOUS EXERCISE (LIKE A BRISK WALK)?: 4 DAYS

## 2024-10-17 SDOH — HEALTH STABILITY: PHYSICAL HEALTH: ON AVERAGE, HOW MANY MINUTES DO YOU ENGAGE IN EXERCISE AT THIS LEVEL?: 50 MIN

## 2024-10-18 ENCOUNTER — OFFICE VISIT (OUTPATIENT)
Dept: INTERNAL MEDICINE CLINIC | Age: 69
End: 2024-10-18

## 2024-10-18 VITALS
BODY MASS INDEX: 23.24 KG/M2 | OXYGEN SATURATION: 98 % | DIASTOLIC BLOOD PRESSURE: 68 MMHG | WEIGHT: 123 LBS | HEART RATE: 73 BPM | SYSTOLIC BLOOD PRESSURE: 102 MMHG

## 2024-10-18 DIAGNOSIS — E55.9 VITAMIN D DEFICIENCY: Chronic | ICD-10-CM

## 2024-10-18 DIAGNOSIS — E78.5 HYPERLIPIDEMIA, UNSPECIFIED HYPERLIPIDEMIA TYPE: ICD-10-CM

## 2024-10-18 DIAGNOSIS — R82.994 HYPERCALCIURIA: Chronic | ICD-10-CM

## 2024-10-18 DIAGNOSIS — M81.0 OSTEOPOROSIS, POSTMENOPAUSAL: Primary | Chronic | ICD-10-CM

## 2024-10-18 ASSESSMENT — PATIENT HEALTH QUESTIONNAIRE - PHQ9
SUM OF ALL RESPONSES TO PHQ QUESTIONS 1-9: 0
SUM OF ALL RESPONSES TO PHQ9 QUESTIONS 1 & 2: 0
SUM OF ALL RESPONSES TO PHQ QUESTIONS 1-9: 0
1. LITTLE INTEREST OR PLEASURE IN DOING THINGS: NOT AT ALL
2. FEELING DOWN, DEPRESSED OR HOPELESS: NOT AT ALL

## 2024-10-18 NOTE — PROGRESS NOTES
Bandar Parr (:  1955) is a 68 y.o. female,Established patient, here for evaluation of the following chief complaint(s):  New Patient         Assessment & Plan  Osteoporosis, postmenopausal    -Continue denosumab         Vitamin D deficiency           Hypercalciuria    -Continue hydrochlorothiazide           No follow-ups on file.       Subjective   Bandar Parr is a 68-year-old female with a past medical history of osteoporosis, hypercalciuria, and vitamin D deficiency who presents for new patient visit.                Review of Systems   Constitutional:  Negative for chills, fatigue and fever.   HENT:  Negative for congestion, ear pain and sore throat.    Respiratory:  Negative for cough and chest tightness.    Cardiovascular:  Negative for chest pain, palpitations and leg swelling.   Gastrointestinal:  Negative for abdominal pain, constipation, diarrhea, nausea and vomiting.   Genitourinary:  Negative for dysuria, flank pain and urgency.   Musculoskeletal:  Negative for arthralgias, back pain and joint swelling.   Skin:  Negative for rash.   Neurological:  Negative for dizziness, weakness and numbness.   Psychiatric/Behavioral:  Negative for sleep disturbance. The patient is not nervous/anxious.           Objective   Physical Exam  Constitutional:       General: She is not in acute distress.  HENT:      Mouth/Throat:      Mouth: Mucous membranes are moist.   Eyes:      General: No scleral icterus.     Pupils: Pupils are equal, round, and reactive to light.   Cardiovascular:      Rate and Rhythm: Normal rate and regular rhythm.      Pulses: Normal pulses.      Heart sounds: No murmur heard.     No gallop.   Pulmonary:      Effort: Pulmonary effort is normal. No respiratory distress.      Breath sounds: Normal breath sounds. No wheezing, rhonchi or rales.   Abdominal:      General: Abdomen is flat. Bowel sounds are normal. There is no distension.      Palpations: Abdomen is soft.      Tenderness:

## 2024-11-01 ENCOUNTER — LAB (OUTPATIENT)
Dept: INTERNAL MEDICINE CLINIC | Age: 69
End: 2024-11-01
Payer: MEDICARE

## 2024-11-01 DIAGNOSIS — E55.9 VITAMIN D DEFICIENCY: Chronic | ICD-10-CM

## 2024-11-01 DIAGNOSIS — E78.5 HYPERLIPIDEMIA, UNSPECIFIED HYPERLIPIDEMIA TYPE: ICD-10-CM

## 2024-11-01 DIAGNOSIS — M81.0 OSTEOPOROSIS, POSTMENOPAUSAL: Chronic | ICD-10-CM

## 2024-11-01 LAB
ALBUMIN SERPL-MCNC: 4.3 G/DL (ref 3.4–5)
ALBUMIN/GLOB SERPL: 2.4 {RATIO} (ref 1.1–2.2)
ALP SERPL-CCNC: 50 U/L (ref 40–129)
ALT SERPL-CCNC: 15 U/L (ref 10–40)
ANION GAP SERPL CALCULATED.3IONS-SCNC: 10 MMOL/L (ref 3–16)
AST SERPL-CCNC: 22 U/L (ref 15–37)
BASOPHILS # BLD: 0 K/UL (ref 0–0.2)
BASOPHILS NFR BLD: 0.5 %
BILIRUB SERPL-MCNC: 0.5 MG/DL (ref 0–1)
BUN SERPL-MCNC: 17 MG/DL (ref 7–20)
CALCIUM SERPL-MCNC: 9.2 MG/DL (ref 8.3–10.6)
CHLORIDE SERPL-SCNC: 103 MMOL/L (ref 99–110)
CHOLEST SERPL-MCNC: 220 MG/DL (ref 0–199)
CO2 SERPL-SCNC: 27 MMOL/L (ref 21–32)
CREAT SERPL-MCNC: 0.7 MG/DL (ref 0.6–1.2)
DEPRECATED RDW RBC AUTO: 12.9 % (ref 12.4–15.4)
EOSINOPHIL # BLD: 0.1 K/UL (ref 0–0.6)
EOSINOPHIL NFR BLD: 1.8 %
GFR SERPLBLD CREATININE-BSD FMLA CKD-EPI: >90 ML/MIN/{1.73_M2}
GLUCOSE SERPL-MCNC: 92 MG/DL (ref 70–99)
HCT VFR BLD AUTO: 42.5 % (ref 36–48)
HDLC SERPL-MCNC: 70 MG/DL (ref 40–60)
HGB BLD-MCNC: 14.5 G/DL (ref 12–16)
LDLC SERPL CALC-MCNC: 134 MG/DL
LYMPHOCYTES # BLD: 1.1 K/UL (ref 1–5.1)
LYMPHOCYTES NFR BLD: 28.2 %
MCH RBC QN AUTO: 31.4 PG (ref 26–34)
MCHC RBC AUTO-ENTMCNC: 34.2 G/DL (ref 31–36)
MCV RBC AUTO: 91.9 FL (ref 80–100)
MONOCYTES # BLD: 0.4 K/UL (ref 0–1.3)
MONOCYTES NFR BLD: 9.2 %
NEUTROPHILS # BLD: 2.5 K/UL (ref 1.7–7.7)
NEUTROPHILS NFR BLD: 60.3 %
PLATELET # BLD AUTO: 158 K/UL (ref 135–450)
PMV BLD AUTO: 9.5 FL (ref 5–10.5)
POTASSIUM SERPL-SCNC: 4.3 MMOL/L (ref 3.5–5.1)
PROT SERPL-MCNC: 6.1 G/DL (ref 6.4–8.2)
RBC # BLD AUTO: 4.63 M/UL (ref 4–5.2)
SODIUM SERPL-SCNC: 140 MMOL/L (ref 136–145)
TRIGL SERPL-MCNC: 78 MG/DL (ref 0–150)
VLDLC SERPL CALC-MCNC: 16 MG/DL
WBC # BLD AUTO: 4.1 K/UL (ref 4–11)

## 2024-11-01 PROCEDURE — 36415 COLL VENOUS BLD VENIPUNCTURE: CPT | Performed by: STUDENT IN AN ORGANIZED HEALTH CARE EDUCATION/TRAINING PROGRAM

## 2024-11-01 PROCEDURE — 99999 PR OFFICE/OUTPT VISIT,PROCEDURE ONLY: CPT | Performed by: STUDENT IN AN ORGANIZED HEALTH CARE EDUCATION/TRAINING PROGRAM

## 2024-11-15 ENCOUNTER — OFFICE VISIT (OUTPATIENT)
Dept: INTERNAL MEDICINE CLINIC | Age: 69
End: 2024-11-15
Payer: MEDICARE

## 2024-11-15 DIAGNOSIS — Z00.00 MEDICARE ANNUAL WELLNESS VISIT, SUBSEQUENT: Primary | ICD-10-CM

## 2024-11-15 PROCEDURE — 1123F ACP DISCUSS/DSCN MKR DOCD: CPT | Performed by: STUDENT IN AN ORGANIZED HEALTH CARE EDUCATION/TRAINING PROGRAM

## 2024-11-15 PROCEDURE — 1159F MED LIST DOCD IN RCRD: CPT | Performed by: STUDENT IN AN ORGANIZED HEALTH CARE EDUCATION/TRAINING PROGRAM

## 2024-11-15 PROCEDURE — G0439 PPPS, SUBSEQ VISIT: HCPCS | Performed by: STUDENT IN AN ORGANIZED HEALTH CARE EDUCATION/TRAINING PROGRAM

## 2024-11-15 SDOH — ECONOMIC STABILITY: FOOD INSECURITY: WITHIN THE PAST 12 MONTHS, THE FOOD YOU BOUGHT JUST DIDN'T LAST AND YOU DIDN'T HAVE MONEY TO GET MORE.: NEVER TRUE

## 2024-11-15 SDOH — ECONOMIC STABILITY: FOOD INSECURITY: WITHIN THE PAST 12 MONTHS, YOU WORRIED THAT YOUR FOOD WOULD RUN OUT BEFORE YOU GOT MONEY TO BUY MORE.: NEVER TRUE

## 2024-11-15 SDOH — ECONOMIC STABILITY: INCOME INSECURITY: HOW HARD IS IT FOR YOU TO PAY FOR THE VERY BASICS LIKE FOOD, HOUSING, MEDICAL CARE, AND HEATING?: NOT HARD AT ALL

## 2024-11-15 ASSESSMENT — PATIENT HEALTH QUESTIONNAIRE - PHQ9
2. FEELING DOWN, DEPRESSED OR HOPELESS: NOT AT ALL
SUM OF ALL RESPONSES TO PHQ QUESTIONS 1-9: 0
SUM OF ALL RESPONSES TO PHQ QUESTIONS 1-9: 0
SUM OF ALL RESPONSES TO PHQ9 QUESTIONS 1 & 2: 0
SUM OF ALL RESPONSES TO PHQ QUESTIONS 1-9: 0
1. LITTLE INTEREST OR PLEASURE IN DOING THINGS: NOT AT ALL
SUM OF ALL RESPONSES TO PHQ QUESTIONS 1-9: 0

## 2024-11-15 NOTE — PROGRESS NOTES
Medicare Annual Wellness Visit    Bandar Parr is here for Medicare AWV    Assessment & Plan   Medicare annual wellness visit, subsequent  Recommendations for Preventive Services Due: see orders and patient instructions/AVS.  Recommended screening schedule for the next 5-10 years is provided to the patient in written form: see Patient Instructions/AVS.     No follow-ups on file.     Subjective       Patient's complete Health Risk Assessment and screening values have been reviewed and are found in Flowsheets. The following problems were reviewed today and where indicated follow up appointments were made and/or referrals ordered.    No Positive Risk Factors identified today.                                    Objective   There were no vitals filed for this visit.   There is no height or weight on file to calculate BMI.                  No Known Allergies  Prior to Visit Medications    Medication Sig Taking? Authorizing Provider   Denosumab (PROLIA SC) Inject into the skin Yes David Andrade MD   hydroCHLOROthiazide 12.5 MG capsule Take 1 capsule by mouth every morning Yes Juancho Clark MD   Estradiol (VAGIFEM) 10 MCG TABS vaginal tablet Place 1 tablet vaginally Twice a Week Yes David Andrade MD   Multiple Vitamins-Minerals (THERAPEUTIC MULTIVITAMIN-MINERALS) tablet Take 1 tablet by mouth daily Vit D 1000 units Yes David Andrade MD       CareTeam (Including outside providers/suppliers regularly involved in providing care):   Patient Care Team:  Lj Lamb MD as PCP - General (Internal Medicine)  Lj Lamb MD as PCP - Empaneled Provider  Tmomy Pearce MD as Consulting Physician (Otolaryngology)      Reviewed and updated this visit:  Allergies  Meds

## 2024-12-19 ENCOUNTER — LAB (OUTPATIENT)
Dept: ENDOCRINOLOGY | Age: 69
End: 2024-12-19
Payer: MEDICARE

## 2024-12-19 DIAGNOSIS — M81.0 OSTEOPOROSIS, POSTMENOPAUSAL: Primary | ICD-10-CM

## 2024-12-19 PROCEDURE — 96372 THER/PROPH/DIAG INJ SC/IM: CPT | Performed by: INTERNAL MEDICINE

## 2025-02-14 ENCOUNTER — OFFICE VISIT (OUTPATIENT)
Dept: INTERNAL MEDICINE CLINIC | Age: 70
End: 2025-02-14

## 2025-02-14 VITALS
SYSTOLIC BLOOD PRESSURE: 110 MMHG | WEIGHT: 126.8 LBS | DIASTOLIC BLOOD PRESSURE: 70 MMHG | TEMPERATURE: 99.5 F | HEIGHT: 61 IN | OXYGEN SATURATION: 95 % | HEART RATE: 87 BPM | BODY MASS INDEX: 23.94 KG/M2

## 2025-02-14 DIAGNOSIS — J10.1 INFLUENZA A: Primary | ICD-10-CM

## 2025-02-14 PROBLEM — Z20.828 EXPOSURE TO INFLUENZA: Status: ACTIVE | Noted: 2025-02-14

## 2025-02-14 LAB
INFLUENZA A ANTIBODY: NORMAL
INFLUENZA B ANTIBODY: NORMAL

## 2025-02-14 RX ORDER — BENZONATATE 100 MG/1
100 CAPSULE ORAL 3 TIMES DAILY PRN
Qty: 21 CAPSULE | Refills: 0 | Status: SHIPPED | OUTPATIENT
Start: 2025-02-14 | End: 2025-02-21

## 2025-02-14 RX ORDER — OSELTAMIVIR PHOSPHATE 75 MG/1
75 CAPSULE ORAL 2 TIMES DAILY
Qty: 10 CAPSULE | Refills: 0 | Status: SHIPPED | OUTPATIENT
Start: 2025-02-14 | End: 2025-02-19

## 2025-02-14 ASSESSMENT — ENCOUNTER SYMPTOMS
VOMITING: 0
VOICE CHANGE: 0
COLOR CHANGE: 0
SHORTNESS OF BREATH: 0
ABDOMINAL PAIN: 0
COUGH: 1
PHOTOPHOBIA: 0
RHINORRHEA: 1
NAUSEA: 1
CONSTIPATION: 0
DIARRHEA: 0
SORE THROAT: 1
TROUBLE SWALLOWING: 0

## 2025-02-14 ASSESSMENT — PATIENT HEALTH QUESTIONNAIRE - PHQ9
SUM OF ALL RESPONSES TO PHQ QUESTIONS 1-9: 0
SUM OF ALL RESPONSES TO PHQ QUESTIONS 1-9: 0
SUM OF ALL RESPONSES TO PHQ9 QUESTIONS 1 & 2: 0
1. LITTLE INTEREST OR PLEASURE IN DOING THINGS: NOT AT ALL
SUM OF ALL RESPONSES TO PHQ QUESTIONS 1-9: 0
2. FEELING DOWN, DEPRESSED OR HOPELESS: NOT AT ALL
SUM OF ALL RESPONSES TO PHQ QUESTIONS 1-9: 0

## 2025-02-14 NOTE — PROGRESS NOTES
oriented to person, place, and time. Mental status is at baseline.   Psychiatric:         Mood and Affect: Mood normal.         Behavior: Behavior normal.        On this date 2/14/2025 I have spent 20 minutes reviewing previous notes, test results and face to face with the patient discussing the diagnosis and importance of compliance with the treatment plan as well as documenting on the day of the visit.      An electronic signature was used to authenticate this note.    --GARY Carter - CNP

## 2025-05-16 ENCOUNTER — TELEPHONE (OUTPATIENT)
Dept: ENDOCRINOLOGY | Age: 70
End: 2025-05-16

## 2025-06-04 ENCOUNTER — TELEPHONE (OUTPATIENT)
Dept: ENDOCRINOLOGY | Age: 70
End: 2025-06-04

## 2025-06-04 NOTE — TELEPHONE ENCOUNTER
Submitted PA for Garlandia  Via [facility] Key: [id number]  STATUS: PENDING.    Follow up done daily; if no decision with in three days we will refax.  If another three days goes by with no decision will call the insurance for status.

## 2025-06-05 NOTE — TELEPHONE ENCOUNTER
Prolia Approval 6/17/25-6/16/26     Approval scanned in media    If this requires a response please respond to the pool ( P MHCX PSC MEDICATION PRE-AUTH).      Thank you please advise patient.

## 2025-07-01 ENCOUNTER — HOSPITAL ENCOUNTER (OUTPATIENT)
Dept: GENERAL RADIOLOGY | Age: 70
Discharge: HOME | End: 2025-07-01
Payer: MEDICARE

## 2025-07-01 ENCOUNTER — OFFICE VISIT (OUTPATIENT)
Dept: ENDOCRINOLOGY | Age: 70
End: 2025-07-01
Payer: MEDICARE

## 2025-07-01 VITALS — WEIGHT: 121.2 LBS | HEIGHT: 61 IN | BODY MASS INDEX: 22.88 KG/M2

## 2025-07-01 DIAGNOSIS — Z51.81 MEDICATION MONITORING ENCOUNTER: ICD-10-CM

## 2025-07-01 DIAGNOSIS — R82.994 HYPERCALCIURIA: ICD-10-CM

## 2025-07-01 DIAGNOSIS — M81.0 OSTEOPOROSIS, POSTMENOPAUSAL: Primary | ICD-10-CM

## 2025-07-01 DIAGNOSIS — M81.0 OSTEOPOROSIS, POSTMENOPAUSAL: ICD-10-CM

## 2025-07-01 DIAGNOSIS — E55.9 VITAMIN D DEFICIENCY: ICD-10-CM

## 2025-07-01 PROCEDURE — 96372 THER/PROPH/DIAG INJ SC/IM: CPT | Performed by: INTERNAL MEDICINE

## 2025-07-01 PROCEDURE — 77080 DXA BONE DENSITY AXIAL: CPT | Performed by: INTERNAL MEDICINE

## 2025-07-01 PROCEDURE — 1159F MED LIST DOCD IN RCRD: CPT | Performed by: INTERNAL MEDICINE

## 2025-07-01 PROCEDURE — 1123F ACP DISCUSS/DSCN MKR DOCD: CPT | Performed by: INTERNAL MEDICINE

## 2025-07-01 PROCEDURE — 99214 OFFICE O/P EST MOD 30 MIN: CPT | Performed by: INTERNAL MEDICINE

## 2025-07-01 PROCEDURE — 77080 DXA BONE DENSITY AXIAL: CPT

## 2025-07-01 RX ORDER — HYDROCHLOROTHIAZIDE 12.5 MG/1
12.5 CAPSULE ORAL EVERY MORNING
Qty: 90 CAPSULE | Refills: 4 | Status: SHIPPED | OUTPATIENT
Start: 2025-07-01

## 2025-07-01 NOTE — PROGRESS NOTES
Informed patient if any signs of redness,rash,swelling or unusual symptoms occur, please contact the office. Prolia given per physician order.    NDC  [id number]    Lot  [id number]    Exp  12/31/27

## 2025-07-01 NOTE — PROGRESS NOTES
[facility]  [address] [place] [address]  Phone [phone]  Fax [phone]    NAME: JARRELL ANAND  YOB: 1955  INITIAL CONSULTATION: 05/10/2011  LAST OFFICE VISIT: 06/13/2024  TODAY'S VISIT: 07/01/2025    Labs @ [facility] 10/2024    PROBLEMS:   Osteoporosis by DXA 11/2010, lowest T-score -2.6 in the spine    Family history of osteoporosis, sister with a hip fracture, mother    Low bone density by DXA 10/2008, lowest T-score -1.8 in the spine    Fractured foot 2012 (slipped)    Natural menopause age 53 (2009)  Hypercalciuria, normal 25-h urine calcium for her is 100-215    330 mg/d10/2011 (she did not want to take HCTZ at that time    294 mg/d 08/2015 on no medication    172 mg/d 01/2016 with HCTZ 12.5 mg/d  Left club foot  Vitamin D, desirable 25-OH D is 30-60 ng/mL    30 ng/mL 08/2010 on 1600 IU/d    18 ng/mL 11/2010 on ergocalciferol 50,000 weekly (08/2010-11/2010)    34 ng/mL 07/2011 on cholecalciferol 1600 IU/d    39 ng/mL 07/2013 on vitamin D 1000 IU/d    44 ng/mL 10/2021    CURRENT MANAGEMENT FOR BONE HEALTH:   Calcium: 900 mg/d diet + 500 mg/d with multivitamin = 1400 mg/d    300 mg other, 300 mg milk, 300 mg yogurt  Vitamin D: 1000 IU/d in MVI  Exercise: walks outside 3 x weekly  Pharmacologic therapy:  Prolia 60 mg SQ twice yearly started 07/2014    PREVIOUS MEDICATIONS FOR OSTEOPOROSIS:   Fosamax 35 mg weekly 09/2009-04/2010, stopped by dentist  Alendronate 07/2013-07/2014, changed to Prolia with hopes of better BMD increase    OTHER CURRENT MEDICATIONS (SELECTED): None  OTC MEDICATIONS: None    CHIEF COMPLAINT: Here for f/u visit of osteoporosis and vitamin D deficiency, monitoring treatment.  No new related signs or symptoms.    PAST MEDICAL HISTORY, FAMILY HISTORY, SOCIAL HISTORY:  Relevant changes since last visit (see patient questionnaire of today's date).    INTERVAL HISTORY: See problem list for chronic/inactive

## 2025-07-11 ENCOUNTER — TRANSCRIBE ORDERS (OUTPATIENT)
Dept: ADMINISTRATIVE | Age: 70
End: 2025-07-11

## 2025-07-11 DIAGNOSIS — N63.32 MASS OF AXILLARY TAIL OF LEFT BREAST: Primary | ICD-10-CM

## 2025-07-17 ENCOUNTER — APPOINTMENT (OUTPATIENT)
Dept: WOMENS IMAGING | Age: 70
End: 2025-07-17
Payer: MEDICARE

## 2025-07-17 ENCOUNTER — HOSPITAL ENCOUNTER (OUTPATIENT)
Dept: WOMENS IMAGING | Age: 70
Discharge: HOME | End: 2025-07-17
Attending: OBSTETRICS & GYNECOLOGY
Payer: MEDICARE

## 2025-07-17 ENCOUNTER — HOSPITAL ENCOUNTER (OUTPATIENT)
Dept: ULTRASOUND IMAGING | Age: 70
Discharge: HOME | End: 2025-07-17
Attending: OBSTETRICS & GYNECOLOGY
Payer: MEDICARE

## 2025-07-17 VITALS — BODY MASS INDEX: 22.47 KG/M2 | HEIGHT: 61 IN | WEIGHT: 119 LBS

## 2025-07-17 DIAGNOSIS — N63.32 MASS OF AXILLARY TAIL OF LEFT BREAST: ICD-10-CM

## 2025-07-17 PROCEDURE — 76882 US LMTD JT/FCL EVL NVASC XTR: CPT

## 2025-07-17 PROCEDURE — G0279 TOMOSYNTHESIS, MAMMO: HCPCS

## 2025-07-18 ENCOUNTER — OFFICE VISIT (OUTPATIENT)
Dept: INTERNAL MEDICINE CLINIC | Age: 70
End: 2025-07-18
Payer: MEDICARE

## 2025-07-18 VITALS
HEART RATE: 69 BPM | WEIGHT: 123 LBS | DIASTOLIC BLOOD PRESSURE: 80 MMHG | OXYGEN SATURATION: 98 % | BODY MASS INDEX: 23.24 KG/M2 | SYSTOLIC BLOOD PRESSURE: 112 MMHG

## 2025-07-18 DIAGNOSIS — H81.10 BENIGN PAROXYSMAL POSITIONAL VERTIGO, UNSPECIFIED LATERALITY: Primary | ICD-10-CM

## 2025-07-18 DIAGNOSIS — R11.0 NAUSEA: ICD-10-CM

## 2025-07-18 PROCEDURE — 1123F ACP DISCUSS/DSCN MKR DOCD: CPT | Performed by: STUDENT IN AN ORGANIZED HEALTH CARE EDUCATION/TRAINING PROGRAM

## 2025-07-18 PROCEDURE — 99214 OFFICE O/P EST MOD 30 MIN: CPT | Performed by: STUDENT IN AN ORGANIZED HEALTH CARE EDUCATION/TRAINING PROGRAM

## 2025-07-18 PROCEDURE — 1159F MED LIST DOCD IN RCRD: CPT | Performed by: STUDENT IN AN ORGANIZED HEALTH CARE EDUCATION/TRAINING PROGRAM

## 2025-07-18 PROCEDURE — G2211 COMPLEX E/M VISIT ADD ON: HCPCS | Performed by: STUDENT IN AN ORGANIZED HEALTH CARE EDUCATION/TRAINING PROGRAM

## 2025-07-18 RX ORDER — ONDANSETRON 4 MG/1
4 TABLET, FILM COATED ORAL 3 TIMES DAILY PRN
Qty: 15 TABLET | Refills: 0 | Status: SHIPPED | OUTPATIENT
Start: 2025-07-18

## 2025-07-18 RX ORDER — MECLIZINE HYDROCHLORIDE 25 MG/1
25 TABLET ORAL 3 TIMES DAILY PRN
Qty: 30 TABLET | Refills: 0 | Status: SHIPPED | OUTPATIENT
Start: 2025-07-18 | End: 2025-07-28

## 2025-07-18 SDOH — ECONOMIC STABILITY: FOOD INSECURITY: WITHIN THE PAST 12 MONTHS, THE FOOD YOU BOUGHT JUST DIDN'T LAST AND YOU DIDN'T HAVE MONEY TO GET MORE.: NEVER TRUE

## 2025-07-18 SDOH — ECONOMIC STABILITY: FOOD INSECURITY: WITHIN THE PAST 12 MONTHS, YOU WORRIED THAT YOUR FOOD WOULD RUN OUT BEFORE YOU GOT MONEY TO BUY MORE.: NEVER TRUE

## 2025-07-18 NOTE — PROGRESS NOTES
Bandar Parr (:  1955) is a 69 y.o. female,, here for evaluation of the following chief complaint(s):  Dizziness  Patient has a known history of vertigo.       1. Benign paroxysmal positional vertigo, unspecified laterality  Worsening  Jacque-Hallpike done at the office positive in the left side.  Start on meclizine  Use Zofran as needed.  Epley's maneuver discussed with the patient, and retractions printed and provided along with the AVS summary.  - meclizine (ANTIVERT) 25 MG tablet; Take 1 tablet by mouth 3 times daily as needed for Dizziness  Dispense: 30 tablet; Refill: 0  - ondansetron (ZOFRAN) 4 MG tablet; Take 1 tablet by mouth 3 times daily as needed for Nausea or Vomiting  Dispense: 15 tablet; Refill: 0    2. Nausea  Intermittent episode  Use of Zofran as needed.  - meclizine (ANTIVERT) 25 MG tablet; Take 1 tablet by mouth 3 times daily as needed for Dizziness  Dispense: 30 tablet; Refill: 0  - ondansetron (ZOFRAN) 4 MG tablet; Take 1 tablet by mouth 3 times daily as needed for Nausea or Vomiting  Dispense: 15 tablet; Refill: 0        Return if symptoms worsen or fail to improve.       Subjective   Woke up Wednesday morning with dizziness- trouble walking and felt dizzy and nauseous.  Couldn't babysit.  Slept throughout the day.  In the past was diagnosed with vertigo and used and  meclizine.  Vertigo is positional. Medicine helped but yesterday night and today morning continues to feel dizzy.    Dizziness  Quality:  Vertigo  Severity:  Moderate  Context: head movement and standing up    Relieved by:  Being still and medication  Worsened by:  Turning head and eye movement  Associated symptoms: no headaches, no shortness of breath, no syncope and no vision changes        Review of Systems   Respiratory:  Negative for shortness of breath.    Cardiovascular:  Negative for syncope.   Neurological:  Positive for dizziness. Negative for headaches.          Objective   Physical Exam  Vitals

## 2025-08-25 ENCOUNTER — OFFICE VISIT (OUTPATIENT)
Age: 70
End: 2025-08-25
Payer: MEDICARE

## 2025-08-25 DIAGNOSIS — L57.0 AK (ACTINIC KERATOSIS): ICD-10-CM

## 2025-08-25 DIAGNOSIS — D22.9 MULTIPLE NEVI: Primary | ICD-10-CM

## 2025-08-25 DIAGNOSIS — Z87.2 HISTORY OF DERMATITIS: ICD-10-CM

## 2025-08-25 DIAGNOSIS — L81.4 LENTIGINES: ICD-10-CM

## 2025-08-25 PROCEDURE — 1159F MED LIST DOCD IN RCRD: CPT | Performed by: DERMATOLOGY

## 2025-08-25 PROCEDURE — 1123F ACP DISCUSS/DSCN MKR DOCD: CPT | Performed by: DERMATOLOGY

## 2025-08-25 PROCEDURE — 99213 OFFICE O/P EST LOW 20 MIN: CPT | Performed by: DERMATOLOGY

## 2025-08-25 PROCEDURE — 1160F RVW MEDS BY RX/DR IN RCRD: CPT | Performed by: DERMATOLOGY

## (undated) DEVICE — 60 ML SYRINGE,REGULAR TIP: Brand: MONOJECT

## (undated) DEVICE — Device: Brand: DISPOSABLE ELECTROSURGICAL SNARE

## (undated) DEVICE — SET VLV 3 PC AWS DISPOSABLE GRDIAN SCOPEVALET

## (undated) DEVICE — SOLUTION IV IRRIG WATER 500ML POUR BRL ST 2F7113

## (undated) DEVICE — TRAP SPEC RETRV CLR PLAS POLYP IN LN SUCT QUIK CTCH

## (undated) DEVICE — BW-412T DISP COMBO CLEANING BRUSH: Brand: SINGLE USE COMBINATION CLEANING BRUSH

## (undated) DEVICE — PROCEDURE KIT ENDOSCP CUST

## (undated) DEVICE — GOWN AURORA NONREINF LG: Brand: MEDLINE INDUSTRIES, INC.